# Patient Record
Sex: MALE | Race: WHITE | HISPANIC OR LATINO | Employment: UNEMPLOYED | URBAN - METROPOLITAN AREA
[De-identification: names, ages, dates, MRNs, and addresses within clinical notes are randomized per-mention and may not be internally consistent; named-entity substitution may affect disease eponyms.]

---

## 2022-04-13 ENCOUNTER — HOSPITAL ENCOUNTER (EMERGENCY)
Facility: HOSPITAL | Age: 27
Discharge: HOME | End: 2022-04-13
Attending: EMERGENCY MEDICINE
Payer: COMMERCIAL

## 2022-04-13 ENCOUNTER — APPOINTMENT (EMERGENCY)
Dept: RADIOLOGY | Facility: HOSPITAL | Age: 27
End: 2022-04-13
Payer: COMMERCIAL

## 2022-04-13 VITALS
OXYGEN SATURATION: 100 % | RESPIRATION RATE: 16 BRPM | SYSTOLIC BLOOD PRESSURE: 113 MMHG | DIASTOLIC BLOOD PRESSURE: 65 MMHG | TEMPERATURE: 97.3 F | HEART RATE: 74 BPM

## 2022-04-13 DIAGNOSIS — S82.142A TIBIAL PLATEAU FRACTURE, LEFT, CLOSED, INITIAL ENCOUNTER: Primary | ICD-10-CM

## 2022-04-13 PROCEDURE — 96372 THER/PROPH/DIAG INJ SC/IM: CPT

## 2022-04-13 PROCEDURE — 73590 X-RAY EXAM OF LOWER LEG: CPT

## 2022-04-13 PROCEDURE — 99285 EMERGENCY DEPT VISIT HI MDM: CPT | Performed by: PHYSICIAN ASSISTANT

## 2022-04-13 PROCEDURE — 73564 X-RAY EXAM KNEE 4 OR MORE: CPT

## 2022-04-13 PROCEDURE — 99283 EMERGENCY DEPT VISIT LOW MDM: CPT

## 2022-04-13 RX ORDER — MORPHINE SULFATE 4 MG/ML
4 INJECTION, SOLUTION INTRAMUSCULAR; INTRAVENOUS ONCE
Status: COMPLETED | OUTPATIENT
Start: 2022-04-13 | End: 2022-04-13

## 2022-04-13 RX ORDER — OXYCODONE HYDROCHLORIDE AND ACETAMINOPHEN 5; 325 MG/1; MG/1
1 TABLET ORAL EVERY 6 HOURS PRN
Qty: 12 TABLET | Refills: 0 | Status: SHIPPED | OUTPATIENT
Start: 2022-04-13 | End: 2022-04-14 | Stop reason: SDUPTHER

## 2022-04-13 RX ORDER — ACETAMINOPHEN 325 MG/1
650 TABLET ORAL ONCE
Status: COMPLETED | OUTPATIENT
Start: 2022-04-13 | End: 2022-04-13

## 2022-04-13 RX ADMIN — ACETAMINOPHEN 325MG 650 MG: 325 TABLET ORAL at 20:51

## 2022-04-13 RX ADMIN — MORPHINE SULFATE 4 MG: 4 INJECTION INTRAVENOUS at 21:25

## 2022-04-13 NOTE — Clinical Note
Marquesclyde Vu was seen and treated in our emergency department on 4/13/2022  No restrictions            Diagnosis:     [facility] DELMY  may return to work on return date  He may return on this date: 04/18/2022         If you have any questions or concerns, please don't hesitate to call        Amy Fontaine RN    ______________________________           _______________          _______________  Hospital Representative                              Date                                Time

## 2022-04-14 ENCOUNTER — TELEPHONE (OUTPATIENT)
Dept: EMERGENCY DEPT | Facility: HOSPITAL | Age: 27
End: 2022-04-14

## 2022-04-14 RX ORDER — OXYCODONE HYDROCHLORIDE AND ACETAMINOPHEN 5; 325 MG/1; MG/1
1 TABLET ORAL EVERY 6 HOURS PRN
Qty: 12 TABLET | Refills: 0 | Status: SHIPPED | OUTPATIENT
Start: 2022-04-14 | End: 2022-04-18 | Stop reason: HOSPADM

## 2022-04-14 NOTE — DISCHARGE INSTRUCTIONS
Take medications as directed  Do not drink or drive after taking these medicines  Call the orthopedic specialist tomorrow morning to schedule an appointment  Return for worsening complaints

## 2022-04-14 NOTE — TELEPHONE ENCOUNTER
Patient called stating he is unable to fill his prescription for oxycodone-acetaminophen due to address discrepancies  I spoke with a pharmacist at the [facility] where it was sent, they stated that the address and telephone number they were giving were different than the ones sent over  The address and phone number in our system were not the ones the pharmicist had  They recommend we try to send again with the address and phone number we have for the patient  Dr Letty Tavarez, the supervising Physician for the patient yesterday, resent the script
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
## 2022-04-15 ENCOUNTER — HOSPITAL ENCOUNTER (OUTPATIENT)
Dept: RADIOLOGY | Facility: HOSPITAL | Age: 27
Discharge: HOME | End: 2022-04-15
Attending: ORTHOPAEDIC SURGERY
Payer: COMMERCIAL

## 2022-04-15 ENCOUNTER — APPOINTMENT (OUTPATIENT)
Dept: LAB | Facility: HOSPITAL | Age: 27
End: 2022-04-15
Attending: ORTHOPAEDIC SURGERY
Payer: COMMERCIAL

## 2022-04-15 VITALS
BODY MASS INDEX: 20.32 KG/M2 | WEIGHT: 150 LBS | DIASTOLIC BLOOD PRESSURE: 74 MMHG | SYSTOLIC BLOOD PRESSURE: 105 MMHG | HEIGHT: 72 IN | HEART RATE: 96 BPM

## 2022-04-15 DIAGNOSIS — S82.142A TIBIAL PLATEAU FRACTURE, LEFT, CLOSED, INITIAL ENCOUNTER: ICD-10-CM

## 2022-04-15 LAB
ANION GAP SERPL CALCULATED.3IONS-SCNC: -2 MMOL/L (ref 4–13)
ATRIAL RATE: 69 BPM
BASOPHILS # BLD AUTO: 0.02 THOUSANDS/ΜL (ref 0–0.1)
BASOPHILS NFR BLD AUTO: 0 % (ref 0–1)
BUN SERPL-MCNC: 13 MG/DL (ref 5–25)
CALCIUM SERPL-MCNC: 9.1 MG/DL (ref 8.3–10.1)
CHLORIDE SERPL-SCNC: 106 MMOL/L (ref 100–108)
CO2 SERPL-SCNC: 32 MMOL/L (ref 21–32)
CREAT SERPL-MCNC: 1.01 MG/DL (ref 0.6–1.3)
EOSINOPHIL # BLD AUTO: 0.19 THOUSAND/ΜL (ref 0–0.61)
EOSINOPHIL NFR BLD AUTO: 2 % (ref 0–6)
ERYTHROCYTE [DISTWIDTH] IN BLOOD BY AUTOMATED COUNT: 13.2 % (ref 11.6–15.1)
GFR SERPL CREATININE-BSD FRML MDRD: 101 ML/MIN/1.73SQ M
GLUCOSE P FAST SERPL-MCNC: 87 MG/DL (ref 65–99)
HCT VFR BLD AUTO: 41.2 % (ref 36.5–49.3)
HGB BLD-MCNC: 13.6 G/DL (ref 12–17)
IMM GRANULOCYTES # BLD AUTO: 0.03 THOUSAND/UL (ref 0–0.2)
IMM GRANULOCYTES NFR BLD AUTO: 0 % (ref 0–2)
LYMPHOCYTES # BLD AUTO: 2.08 THOUSANDS/ΜL (ref 0.6–4.47)
LYMPHOCYTES NFR BLD AUTO: 23 % (ref 14–44)
MCH RBC QN AUTO: 30.5 PG (ref 26.8–34.3)
MCHC RBC AUTO-ENTMCNC: 33 G/DL (ref 31.4–37.4)
MCV RBC AUTO: 92 FL (ref 82–98)
MONOCYTES # BLD AUTO: 1.04 THOUSAND/ΜL (ref 0.17–1.22)
MONOCYTES NFR BLD AUTO: 11 % (ref 4–12)
NEUTROPHILS # BLD AUTO: 5.85 THOUSANDS/ΜL (ref 1.85–7.62)
NEUTS SEG NFR BLD AUTO: 64 % (ref 43–75)
NRBC BLD AUTO-RTO: 0 /100 WBCS
P AXIS: 76 DEGREES
PLATELET # BLD AUTO: 164 THOUSANDS/UL (ref 149–390)
PMV BLD AUTO: 10.8 FL (ref 8.9–12.7)
POTASSIUM SERPL-SCNC: 3.8 MMOL/L (ref 3.5–5.3)
PR INTERVAL: 168 MS
QRS AXIS: 83 DEGREES
QRSD INTERVAL: 100 MS
QT INTERVAL: 366 MS
QTC INTERVAL: 392 MS
RBC # BLD AUTO: 4.46 MILLION/UL (ref 3.88–5.62)
SODIUM SERPL-SCNC: 136 MMOL/L (ref 136–145)
T WAVE AXIS: 62 DEGREES
VENTRICULAR RATE: 69 BPM
WBC # BLD AUTO: 9.21 THOUSAND/UL (ref 4.31–10.16)

## 2022-04-15 PROCEDURE — 93010 ELECTROCARDIOGRAM REPORT: CPT | Performed by: INTERNAL MEDICINE

## 2022-04-15 PROCEDURE — 93005 ELECTROCARDIOGRAM TRACING: CPT

## 2022-04-15 PROCEDURE — 99204 OFFICE O/P NEW MOD 45 MIN: CPT | Performed by: ORTHOPAEDIC SURGERY

## 2022-04-15 PROCEDURE — 73700 CT LOWER EXTREMITY W/O DYE: CPT

## 2022-04-15 PROCEDURE — 80048 BASIC METABOLIC PNL TOTAL CA: CPT

## 2022-04-15 PROCEDURE — 36415 COLL VENOUS BLD VENIPUNCTURE: CPT

## 2022-04-15 PROCEDURE — 85025 COMPLETE CBC W/AUTO DIFF WBC: CPT

## 2022-04-15 PROCEDURE — G1004 CDSM NDSC: HCPCS

## 2022-04-15 RX ORDER — CEFAZOLIN SODIUM 2 G/50ML
2000 SOLUTION INTRAVENOUS ONCE
Status: CANCELLED | OUTPATIENT
Start: 2022-04-15 | End: 2022-04-15

## 2022-04-15 RX ORDER — CHLORHEXIDINE GLUCONATE 0.12 MG/ML
15 RINSE ORAL ONCE
Status: CANCELLED | OUTPATIENT
Start: 2022-04-15 | End: 2022-04-15

## 2022-04-15 RX ORDER — OXYCODONE HYDROCHLORIDE 5 MG/1
5 TABLET ORAL EVERY 4 HOURS PRN
Qty: 28 TABLET | Refills: 0 | Status: SHIPPED | OUTPATIENT
Start: 2022-04-17

## 2022-04-15 RX ORDER — NALOXONE HYDROCHLORIDE 4 MG/.1ML
SPRAY NASAL
Qty: 1 EACH | Refills: 1 | Status: SHIPPED | OUTPATIENT
Start: 2022-04-15
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
## 2022-04-15 NOTE — LETTER
April 15, 2022     Patient: Gareth Avendano  YOB: 1995  Date of Visit: 4/15/2022      To Whom it May Concern:    Miranda Zavaleta is under my professional care  Sarina Solorzano was seen in my office on 4/15/2022  Patient may not return to work until otherwise stated  If you have any questions or concerns, please don't hesitate to call  Sincerely,          Keven Stapleton MD        CC: Cinda Thomas

## 2022-04-15 NOTE — PATIENT INSTRUCTIONS
- Discussed conservative and surgical intervention with patient at length  Patient opted for surgical intervention at this time   - Risks and benefits were discussed with patient at length  Procedure being performed: ORIF left tibial plateau fracture  informed consent was obtained at this time  - STAT CT of the left knee ordered to evaluate extent of fracture   - Non-weight bearing left lower extremity in knee immobilizer   - Out of work until otherwise stated   - Over the counter analgesics as needed / directed   - Ice / heat as directed   - Please d/c tobacco use until otherwise stated  Failure to do so may result in wound healing complications, suboptimal surgical results, possible limb impairment, loss of limb     - Follow up post op for sutures out, XR

## 2022-04-17 ENCOUNTER — ANESTHESIA EVENT (OUTPATIENT)
Dept: PERIOP | Facility: HOSPITAL | Age: 27
End: 2022-04-17
Payer: COMMERCIAL

## 2022-04-18 ENCOUNTER — HOSPITAL ENCOUNTER (OUTPATIENT)
Facility: HOSPITAL | Age: 27
Setting detail: OUTPATIENT SURGERY
Discharge: HOME | End: 2022-04-18
Attending: ORTHOPAEDIC SURGERY | Admitting: ORTHOPAEDIC SURGERY
Payer: COMMERCIAL

## 2022-04-18 ENCOUNTER — APPOINTMENT (OUTPATIENT)
Dept: RADIOLOGY | Facility: HOSPITAL | Age: 27
End: 2022-04-18
Payer: COMMERCIAL

## 2022-04-18 ENCOUNTER — ANESTHESIA (OUTPATIENT)
Dept: PERIOP | Facility: HOSPITAL | Age: 27
End: 2022-04-18
Payer: COMMERCIAL

## 2022-04-18 VITALS
HEART RATE: 73 BPM | WEIGHT: 150 LBS | SYSTOLIC BLOOD PRESSURE: 137 MMHG | BODY MASS INDEX: 19.25 KG/M2 | OXYGEN SATURATION: 97 % | RESPIRATION RATE: 16 BRPM | HEIGHT: 74 IN | DIASTOLIC BLOOD PRESSURE: 82 MMHG | TEMPERATURE: 98.1 F

## 2022-04-18 DIAGNOSIS — S82.142A TIBIAL PLATEAU FRACTURE, LEFT, CLOSED, INITIAL ENCOUNTER: Primary | ICD-10-CM

## 2022-04-18 PROBLEM — F17.200 SMOKING: Status: ACTIVE | Noted: 2022-04-18

## 2022-04-18 PROCEDURE — C1713 ANCHOR/SCREW BN/BN,TIS/BN: HCPCS | Performed by: ORTHOPAEDIC SURGERY

## 2022-04-18 PROCEDURE — 27536 TREAT KNEE FRACTURE: CPT | Performed by: PHYSICIAN ASSISTANT

## 2022-04-18 PROCEDURE — 73590 X-RAY EXAM OF LOWER LEG: CPT

## 2022-04-18 PROCEDURE — C1769 GUIDE WIRE: HCPCS | Performed by: ORTHOPAEDIC SURGERY

## 2022-04-18 PROCEDURE — 27536 TREAT KNEE FRACTURE: CPT | Performed by: ORTHOPAEDIC SURGERY

## 2022-04-18 DEVICE — 3.5MM VARIABLE ANGLE LOCKING SCREW/SLF-TPNG/STRDRV/75MM: Type: IMPLANTABLE DEVICE | Site: LEG | Status: FUNCTIONAL

## 2022-04-18 DEVICE — 3.5MM CORTEX SCREW SELF-TAPPING 44MM: Type: IMPLANTABLE DEVICE | Site: LEG | Status: FUNCTIONAL

## 2022-04-18 DEVICE — 3.5MM LCP T-PLATE 3H HEAD/ 5H SHAFT/67MM-RIGHT ANGLE
Type: IMPLANTABLE DEVICE | Site: LEG | Status: FUNCTIONAL
Brand: LCP

## 2022-04-18 DEVICE — 3.5MM CORTEX SCREW SELF-TAPPING 36MM: Type: IMPLANTABLE DEVICE | Site: LEG | Status: FUNCTIONAL

## 2022-04-18 DEVICE — 3.5MM CORTEX SCREW SELF-TAPPING 55MM: Type: IMPLANTABLE DEVICE | Site: LEG | Status: FUNCTIONAL

## 2022-04-18 DEVICE — 3.5MM LCP RECONSTRUCTION PLATE 6 HOLES/84MM
Type: IMPLANTABLE DEVICE | Site: LEG | Status: FUNCTIONAL
Brand: LCP

## 2022-04-18 DEVICE — 4.0MM CANNULATED SCREW LONG THREAD/48MM: Type: IMPLANTABLE DEVICE | Site: LEG | Status: FUNCTIONAL

## 2022-04-18 DEVICE — 3.5MM PELVIC CORTEX SCREW SELF-TAPPING 80MM: Type: IMPLANTABLE DEVICE | Site: LEG | Status: FUNCTIONAL

## 2022-04-18 DEVICE — 3.5MM VARIABLE ANGLE LOCKING SCREW/SLF-TPNG/STRDRV/65MM: Type: IMPLANTABLE DEVICE | Site: LEG | Status: FUNCTIONAL

## 2022-04-18 DEVICE — 3.5MM LOCKING SCREW SLF-TPNG W/STARDRIVE(TM) RECESS 34MM: Type: IMPLANTABLE DEVICE | Site: LEG | Status: FUNCTIONAL

## 2022-04-18 DEVICE — 3.5MM CORTEX SCREW SELF-TAPPING 38MM: Type: IMPLANTABLE DEVICE | Site: LEG | Status: FUNCTIONAL

## 2022-04-18 DEVICE — 3.5MM LOCKING SCREW SLF-TPNG W/STARDRIVE(TM) RECESS 22MM: Type: IMPLANTABLE DEVICE | Site: LEG | Status: FUNCTIONAL

## 2022-04-18 DEVICE — 3.5MM VA-LCP PROX TIBIA PLATE SMALL BEND/6H/117MM/LEFT
Type: IMPLANTABLE DEVICE | Site: LEG | Status: FUNCTIONAL
Brand: VA-LCP

## 2022-04-18 DEVICE — 3.5MM VARIABLE ANGLE LOCKING SCREW/SLF-TPNG/STRDRV/70MM: Type: IMPLANTABLE DEVICE | Site: LEG | Status: FUNCTIONAL

## 2022-04-18 DEVICE — 3.5MM PELVIC CORTEX SCREW SELF-TAPPING 75MM: Type: IMPLANTABLE DEVICE | Site: LEG | Status: FUNCTIONAL

## 2022-04-18 RX ORDER — CEFAZOLIN SODIUM 2 G/50ML
2000 SOLUTION INTRAVENOUS ONCE
Status: COMPLETED | OUTPATIENT
Start: 2022-04-18 | End: 2022-04-18

## 2022-04-18 RX ORDER — GLYCOPYRROLATE 0.2 MG/ML
INJECTION INTRAMUSCULAR; INTRAVENOUS AS NEEDED
Status: DISCONTINUED | OUTPATIENT
Start: 2022-04-18 | End: 2022-04-18

## 2022-04-18 RX ORDER — PROPOFOL 10 MG/ML
INJECTION, EMULSION INTRAVENOUS AS NEEDED
Status: DISCONTINUED | OUTPATIENT
Start: 2022-04-18 | End: 2022-04-18

## 2022-04-18 RX ORDER — ROCURONIUM BROMIDE 10 MG/ML
INJECTION, SOLUTION INTRAVENOUS AS NEEDED
Status: DISCONTINUED | OUTPATIENT
Start: 2022-04-18 | End: 2022-04-18

## 2022-04-18 RX ORDER — SODIUM CHLORIDE 9 MG/ML
INJECTION, SOLUTION INTRAVENOUS CONTINUOUS PRN
Status: DISCONTINUED | OUTPATIENT
Start: 2022-04-18 | End: 2022-04-18

## 2022-04-18 RX ORDER — CHLORHEXIDINE GLUCONATE 0.12 MG/ML
15 RINSE ORAL ONCE
Status: COMPLETED | OUTPATIENT
Start: 2022-04-18 | End: 2022-04-18

## 2022-04-18 RX ORDER — BUPIVACAINE HYDROCHLORIDE 2.5 MG/ML
INJECTION, SOLUTION EPIDURAL; INFILTRATION; INTRACAUDAL AS NEEDED
Status: DISCONTINUED | OUTPATIENT
Start: 2022-04-18 | End: 2022-04-18 | Stop reason: HOSPADM

## 2022-04-18 RX ORDER — VANCOMYCIN HYDROCHLORIDE 1 G/20ML
INJECTION, POWDER, LYOPHILIZED, FOR SOLUTION INTRAVENOUS AS NEEDED
Status: DISCONTINUED | OUTPATIENT
Start: 2022-04-18 | End: 2022-04-18 | Stop reason: HOSPADM

## 2022-04-18 RX ORDER — HYDROMORPHONE HCL/PF 1 MG/ML
0.5 SYRINGE (ML) INJECTION
Status: COMPLETED | OUTPATIENT
Start: 2022-04-18 | End: 2022-04-18

## 2022-04-18 RX ORDER — DEXAMETHASONE SODIUM PHOSPHATE 10 MG/ML
INJECTION, SOLUTION INTRAMUSCULAR; INTRAVENOUS AS NEEDED
Status: DISCONTINUED | OUTPATIENT
Start: 2022-04-18 | End: 2022-04-18

## 2022-04-18 RX ORDER — KETAMINE HCL IN NACL, ISO-OSM 100MG/10ML
SYRINGE (ML) INJECTION AS NEEDED
Status: DISCONTINUED | OUTPATIENT
Start: 2022-04-18 | End: 2022-04-18

## 2022-04-18 RX ORDER — MIDAZOLAM HYDROCHLORIDE 2 MG/2ML
INJECTION, SOLUTION INTRAMUSCULAR; INTRAVENOUS AS NEEDED
Status: DISCONTINUED | OUTPATIENT
Start: 2022-04-18 | End: 2022-04-18

## 2022-04-18 RX ORDER — ONDANSETRON 2 MG/ML
INJECTION INTRAMUSCULAR; INTRAVENOUS AS NEEDED
Status: DISCONTINUED | OUTPATIENT
Start: 2022-04-18 | End: 2022-04-18

## 2022-04-18 RX ORDER — ONDANSETRON 2 MG/ML
4 INJECTION INTRAMUSCULAR; INTRAVENOUS EVERY 6 HOURS PRN
Status: CANCELLED | OUTPATIENT
Start: 2022-04-18

## 2022-04-18 RX ORDER — ACETAMINOPHEN 325 MG/1
650 TABLET ORAL EVERY 6 HOURS PRN
Status: CANCELLED | OUTPATIENT
Start: 2022-04-18

## 2022-04-18 RX ORDER — ASPIRIN 81 MG/1
81 TABLET ORAL EVERY 12 HOURS
Qty: 84 TABLET | Refills: 0 | Status: SHIPPED | OUTPATIENT
Start: 2022-04-18

## 2022-04-18 RX ORDER — HYDROMORPHONE HCL 110MG/55ML
PATIENT CONTROLLED ANALGESIA SYRINGE INTRAVENOUS AS NEEDED
Status: DISCONTINUED | OUTPATIENT
Start: 2022-04-18 | End: 2022-04-18

## 2022-04-18 RX ORDER — KETOROLAC TROMETHAMINE 30 MG/ML
INJECTION, SOLUTION INTRAMUSCULAR; INTRAVENOUS AS NEEDED
Status: DISCONTINUED | OUTPATIENT
Start: 2022-04-18 | End: 2022-04-18

## 2022-04-18 RX ORDER — MAGNESIUM HYDROXIDE 1200 MG/15ML
LIQUID ORAL AS NEEDED
Status: DISCONTINUED | OUTPATIENT
Start: 2022-04-18 | End: 2022-04-18 | Stop reason: HOSPADM

## 2022-04-18 RX ORDER — LIDOCAINE HYDROCHLORIDE 10 MG/ML
INJECTION, SOLUTION EPIDURAL; INFILTRATION; INTRACAUDAL; PERINEURAL AS NEEDED
Status: DISCONTINUED | OUTPATIENT
Start: 2022-04-18 | End: 2022-04-18

## 2022-04-18 RX ORDER — NEOSTIGMINE METHYLSULFATE 1 MG/ML
INJECTION INTRAVENOUS AS NEEDED
Status: DISCONTINUED | OUTPATIENT
Start: 2022-04-18 | End: 2022-04-18

## 2022-04-18 RX ORDER — FENTANYL CITRATE/PF 50 MCG/ML
50 SYRINGE (ML) INJECTION
Status: DISCONTINUED | OUTPATIENT
Start: 2022-04-18 | End: 2022-04-18 | Stop reason: HOSPADM

## 2022-04-18 RX ORDER — LIDOCAINE HYDROCHLORIDE 20 MG/ML
INJECTION, SOLUTION EPIDURAL; INFILTRATION; INTRACAUDAL; PERINEURAL AS NEEDED
Status: DISCONTINUED | OUTPATIENT
Start: 2022-04-18 | End: 2022-04-18

## 2022-04-18 RX ORDER — FENTANYL CITRATE 50 UG/ML
INJECTION, SOLUTION INTRAMUSCULAR; INTRAVENOUS AS NEEDED
Status: DISCONTINUED | OUTPATIENT
Start: 2022-04-18 | End: 2022-04-18

## 2022-04-18 RX ORDER — TRAMADOL HYDROCHLORIDE 50 MG/1
50 TABLET ORAL EVERY 6 HOURS SCHEDULED
Status: CANCELLED | OUTPATIENT
Start: 2022-04-18

## 2022-04-18 RX ORDER — SODIUM CHLORIDE, SODIUM LACTATE, POTASSIUM CHLORIDE, CALCIUM CHLORIDE 600; 310; 30; 20 MG/100ML; MG/100ML; MG/100ML; MG/100ML
125 INJECTION, SOLUTION INTRAVENOUS CONTINUOUS
Status: DISCONTINUED | OUTPATIENT
Start: 2022-04-18 | End: 2022-04-18

## 2022-04-18 RX ORDER — HYDROMORPHONE HCL/PF 1 MG/ML
0.5 SYRINGE (ML) INJECTION
Status: DISCONTINUED | OUTPATIENT
Start: 2022-04-18 | End: 2022-04-18 | Stop reason: HOSPADM

## 2022-04-18 RX ORDER — ONDANSETRON 2 MG/ML
4 INJECTION INTRAMUSCULAR; INTRAVENOUS ONCE AS NEEDED
Status: DISCONTINUED | OUTPATIENT
Start: 2022-04-18 | End: 2022-04-18 | Stop reason: HOSPADM

## 2022-04-18 RX ADMIN — SODIUM CHLORIDE, SODIUM LACTATE, POTASSIUM CHLORIDE, AND CALCIUM CHLORIDE: .6; .31; .03; .02 INJECTION, SOLUTION INTRAVENOUS at 10:20

## 2022-04-18 RX ADMIN — ROCURONIUM BROMIDE 10 MG: 10 INJECTION, SOLUTION INTRAVENOUS at 09:07

## 2022-04-18 RX ADMIN — HYDROMORPHONE HYDROCHLORIDE 0.5 MG: 1 INJECTION, SOLUTION INTRAMUSCULAR; INTRAVENOUS; SUBCUTANEOUS at 12:55

## 2022-04-18 RX ADMIN — FENTANYL CITRATE 100 MCG: 50 INJECTION, SOLUTION INTRAMUSCULAR; INTRAVENOUS at 08:07

## 2022-04-18 RX ADMIN — SODIUM CHLORIDE: 0.9 INJECTION, SOLUTION INTRAVENOUS at 08:01

## 2022-04-18 RX ADMIN — HYDROMORPHONE HYDROCHLORIDE 0.5 MG: 2 INJECTION, SOLUTION INTRAMUSCULAR; INTRAVENOUS; SUBCUTANEOUS at 09:27

## 2022-04-18 RX ADMIN — MIDAZOLAM HYDROCHLORIDE 2 MG: 1 INJECTION, SOLUTION INTRAMUSCULAR; INTRAVENOUS at 07:51

## 2022-04-18 RX ADMIN — HYDROMORPHONE HYDROCHLORIDE 0.5 MG: 2 INJECTION, SOLUTION INTRAMUSCULAR; INTRAVENOUS; SUBCUTANEOUS at 08:40

## 2022-04-18 RX ADMIN — Medication 20 MG: at 09:27

## 2022-04-18 RX ADMIN — HYDROMORPHONE HYDROCHLORIDE 0.5 MG: 2 INJECTION, SOLUTION INTRAMUSCULAR; INTRAVENOUS; SUBCUTANEOUS at 10:09

## 2022-04-18 RX ADMIN — ROCURONIUM BROMIDE 10 MG: 10 INJECTION, SOLUTION INTRAVENOUS at 10:05

## 2022-04-18 RX ADMIN — SODIUM CHLORIDE, SODIUM LACTATE, POTASSIUM CHLORIDE, AND CALCIUM CHLORIDE: .6; .31; .03; .02 INJECTION, SOLUTION INTRAVENOUS at 07:28

## 2022-04-18 RX ADMIN — Medication 10 MG: at 08:50

## 2022-04-18 RX ADMIN — ONDANSETRON 4 MG: 2 INJECTION INTRAMUSCULAR; INTRAVENOUS at 11:25

## 2022-04-18 RX ADMIN — CHLORHEXIDINE GLUCONATE 0.12% ORAL RINSE 15 ML: 1.2 LIQUID ORAL at 07:38

## 2022-04-18 RX ADMIN — Medication 20 MG: at 10:11

## 2022-04-18 RX ADMIN — ROCURONIUM BROMIDE 20 MG: 10 INJECTION, SOLUTION INTRAVENOUS at 09:27

## 2022-04-18 RX ADMIN — FENTANYL CITRATE 50 MCG: 50 INJECTION, SOLUTION INTRAMUSCULAR; INTRAVENOUS at 07:51

## 2022-04-18 RX ADMIN — SODIUM CHLORIDE, SODIUM LACTATE, POTASSIUM CHLORIDE, AND CALCIUM CHLORIDE 125 ML/HR: .6; .31; .03; .02 INJECTION, SOLUTION INTRAVENOUS at 07:38

## 2022-04-18 RX ADMIN — LIDOCAINE HYDROCHLORIDE 100 MG: 20 INJECTION, SOLUTION EPIDURAL; INFILTRATION; INTRACAUDAL; PERINEURAL at 07:58

## 2022-04-18 RX ADMIN — DEXAMETHASONE SODIUM PHOSPHATE 10 MG: 10 INJECTION, SOLUTION INTRAMUSCULAR; INTRAVENOUS at 08:11

## 2022-04-18 RX ADMIN — FENTANYL CITRATE 50 MCG: 50 INJECTION, SOLUTION INTRAMUSCULAR; INTRAVENOUS at 12:26

## 2022-04-18 RX ADMIN — PROPOFOL 300 MG: 10 INJECTION, EMULSION INTRAVENOUS at 07:58

## 2022-04-18 RX ADMIN — FENTANYL CITRATE 50 MCG: 50 INJECTION, SOLUTION INTRAMUSCULAR; INTRAVENOUS at 12:33

## 2022-04-18 RX ADMIN — KETOROLAC TROMETHAMINE 30 MG: 30 INJECTION, SOLUTION INTRAMUSCULAR at 11:25

## 2022-04-18 RX ADMIN — NEOSTIGMINE METHYLSULFATE 3 MG: 1 INJECTION INTRAVENOUS at 12:01

## 2022-04-18 RX ADMIN — CEFAZOLIN SODIUM 2000 MG: 2 SOLUTION INTRAVENOUS at 07:55

## 2022-04-18 RX ADMIN — ROCURONIUM BROMIDE 10 MG: 10 INJECTION, SOLUTION INTRAVENOUS at 08:55

## 2022-04-18 RX ADMIN — HYDROMORPHONE HYDROCHLORIDE 0.5 MG: 1 INJECTION, SOLUTION INTRAMUSCULAR; INTRAVENOUS; SUBCUTANEOUS at 12:48

## 2022-04-18 RX ADMIN — ROCURONIUM BROMIDE 50 MG: 10 INJECTION, SOLUTION INTRAVENOUS at 07:58

## 2022-04-18 RX ADMIN — CEFAZOLIN SODIUM 2000 MG: 2 SOLUTION INTRAVENOUS at 11:46

## 2022-04-18 RX ADMIN — HYDROMORPHONE HYDROCHLORIDE 0.5 MG: 2 INJECTION, SOLUTION INTRAMUSCULAR; INTRAVENOUS; SUBCUTANEOUS at 10:39

## 2022-04-18 RX ADMIN — GLYCOPYRROLATE 0.4 MG: 0.2 INJECTION, SOLUTION INTRAMUSCULAR; INTRAVENOUS at 12:01

## 2022-04-18 RX ADMIN — FENTANYL CITRATE 50 MCG: 50 INJECTION, SOLUTION INTRAMUSCULAR; INTRAVENOUS at 07:58

## 2022-04-18 NOTE — DISCHARGE INSTRUCTIONS
Discharge Instructions - Orthopedics  Arlene Manners 31 y o  male MRN: [id number]  Unit/Bed#: PACU 2    Weight Bearing Status:                                           Nonweightbearing on left lower extremity in knee immobilizer  May remove knee immobilizer in 1 week for gentle movement of the knee     DVT prophylaxis  Aspirin 81 mg twice daily x6 weeks    Pain:  Continue analgesics as directed    Dressing Instructions: May remove dressings in 1 week  Okay to begin showering at that time  Allow water to flow over the incision sites  Do not vigorously scrubbed or soak wounds until otherwise stated  Otherwise keep dressings clean and dry  Appt Instructions: If you do not have your appointment, please call the clinic at [phone] t  Otherwise followup as scheduled     Contact the office sooner if you experience any increased numbness/tingling in the extremities  Miscellaneous:  Please d/c tobacco use until otherwise stated  Failure to do so may result in wound healing complications, suboptimal surgical results, possible limb impairment, loss of limb

## 2022-04-18 NOTE — ANESTHESIA POSTPROCEDURE EVALUATION
Post-Op Assessment Note    CV Status:  Stable  Pain Score: 0    Pain management: adequate     Mental Status:  Alert and awake   Hydration Status:  Euvolemic   PONV Controlled:  Controlled   Airway Patency:  Patent      Post Op Vitals Reviewed: Yes      Staff: Anesthesiologist, CRNA         No complications documented      /88 (04/18/22 1219)    Temp 99 1 °F (37 3 °C) (04/18/22 1219)    Pulse 92 (04/18/22 1219)   Resp 14 (04/18/22 1219)    SpO2 96 % (04/18/22 1219)

## 2022-04-18 NOTE — ANESTHESIA PREPROCEDURE EVALUATION
Procedure:  OPEN REDUCTION W/ INTERNAL FIXATION (ORIF) LEFT TIBIAL PLATEAU FRACTURE (Left Knee)    Relevant Problems   ANESTHESIA (within normal limits)      CARDIO (within normal limits)      ENDO (within normal limits)      PULMONARY   (+) Smoking        Physical Exam    Airway    Mallampati score: II  TM Distance: >3 FB  Neck ROM: full     Dental   No notable dental hx     Cardiovascular  Rhythm: regular, Rate: normal,     Pulmonary  Breath sounds clear to auscultation,     Other Findings        Anesthesia Plan  ASA Score- 2     Anesthesia Type- general with ASA Monitors  Additional Monitors:   Airway Plan:           Plan Factors-Exercise tolerance (METS): >4 METS  Chart reviewed  Existing labs reviewed  Patient summary reviewed  Patient is a current smoker  Patient smoked on day of surgery  Obstructive sleep apnea risk education given perioperatively  Induction- intravenous  Postoperative Plan- Plan for postoperative opioid use  Planned trial extubation    Informed Consent- Anesthetic plan and risks discussed with patient  I personally reviewed this patient with the CRNA  Discussed and agreed on the Anesthesia Plan with the CRNA  Darletta Pac

## 2022-04-18 NOTE — OP NOTE
OPERATIVE REPORT  PATIENT NAME: Veronica De Leon    :  1995  MRN: [id number]  Pt Location: [facility] ROOM 04    SURGERY DATE: 2022    Surgeon(s) and Role:     * Jules Reed MD - Primary     * Delbert Oneil PA-C - Assisting    Preop Diagnosis:  Left bicondylar tibial plateau fracture    Post-Op Diagnosis Codes:  Same    Procedure(s) (LRB):  OPEN REDUCTION W/ INTERNAL FIXATION (ORIF) LEFT TIBIAL PLATEAU FRACTURE (Left)    Procedure:  ORIF L bicondylar tibial plateau fracture (CPT 05852)    Specimen(s):  * No specimens in log *    Estimated Blood Loss:   200 mL    Drains:  [REMOVED] Urethral Catheter Non-latex 16 Fr  (Removed)   Number of days: 0       Anesthesia Type:   General    Operative Indications:  L tibia bicondylar tibial plateau fracture    Operative Findings:  See below    Complications:   None    Implants: Synthes 4 0 mm cannulated screw x1, 6 hl 3 5 mm VA-LCP proximal lateral tibia plate, 8 0FQ LCP 6 hl recon plate, 3 5 mm LCP  7PA/0IX T plate    Procedure and Technique:  Patient is a 44-year-old male who sustained a left tibia bicondylar plateau fracture  The patient was found to be in greater than 10° of clinical valgus at rest   The patient was also noted to have a fragment of bone that had displaced into the joint, fracture lines were found to be of a bicondylar nature although with minimal lateral articular surface depression  The patient was therefore indicated for operative fixation of his injury  The patient's operative site, laterality, procedure, consent were verified in the preoperative area the patient transitioned to the operating room  General anesthesia was provided by the anesthesia team, a nonsterile tourniquet was applied to the upper extremity and used for total of 120 minutes and not reinflated  A Rehman catheter was placed which was removed at the end of the case  The operative extremity was prepped and draped in the usual sterile fashion    After time-out for safety, standard anterolateral approach took place to the proximal lateral tibial plateau  Fascia was incised and anterior compartment musculature was elevated off the bone  Arthrotomy was then made at the level of the joint and hematoma was evacuated  Suture utilized through the capsular tissue and meniscus to provide retraction of tissue to be able to visualize the joint  There was no lateral meniscus tear noted  With traction and varus moment, we were able to visualize across to the medial compartment through our arthrotomy  The loose fragment of bone was identified in the joint with biplanar fluoroscopic imaging and was removed with pituitary  The tibial spine component of the fracture was then maneuvered into place with dental pick as well as an anterior lateral fragment containing articular cartilage was also maneuvered into position and these were held in place with K-wires  Medial incision was then made posterior to the posterior aspect of the proximal tibia  Dissection took place utilizing Metzenbaum scissors and taking care to protect saphenous structures and hamstring tendons  Fascia was incised and muscle released off the posterior aspect of the bone to identify posterior medial fracture line which was well reduced  A pointed Dickerson clamp was utilized to position the proximal lateral plateau condylar fragment into proper position and this was held in place with K-wires driven from lateral to medial   Clamp was removed and reduction was found to be maintained  To provide fixation at the anterior lateral articular component, a 4 0 mm cannulated screw was driven from anterior to posterior and was found to provide satisfactory compression  The proximal lateral tibial plate was then applied to the bone and periarticular clamp was applied to obtain further compression across the lateral split  Distal cortical and proximal cortical screws were placed to reduce plate to bone    A true 3 5 mm cortical lag screw was then also placed through the plate to provide further compression across the lateral split  Remaining screws were placed distally and locking cluster and kickstand locker screws were placed proximally  Attention was then returned to the medial aspect of the tibia, a T-plate was applied to the posteromedial aspect of the tibia and cortical screws were placed to apply the plate in buttress fashion  Given the more anteromedial fracture line as well as involvement of the whole medial condyle, a 6 hole recon plate was then contoured and placed directly medial and fixation took place distally with cortical screws and proximally with locking screws  The wounds were copiously irrigated with sterile saline  Medial fascia was closed with 0 Vicryl suture  Arthrotomy was repaired with 0 Ethibond suture  Lateral fascia was repaired with 0 Ethibond suture  Subcutaneous layer closure took place at both incisions with 2-0 Vicryl suture  Skin layer closure took place with 3-0 nylon suture in interrupted fashion  Sterile dressings consisted of Adaptic, sterile gauze, sterile ABD pad, sterile Webril  The leg was wrapped from foot to thigh with Webril and Ace wraps and the patient was placed in a knee immobilizer  All final counts, including but not limited to instrument, sponge, needle counts were correct  I was present for the entire procedure  The patient was extubated and awakened and transitioned to the PACU in stable condition  There were no immediate complications  There was no qualified resident available for the procedure  Critical assistance from Miguel Salazar PA-C was required for all components of the procedure including but not limited to positioning, soft tissue retraction, assistance with reduction of fracture, assistance with visualization of articular surface  This was particularly important given the complex articular nature of the injury      The patient will be nonweightbearing on the operative extremity for an anticipated 10-12 weeks  The patient will require aspirin for deep vein thrombosis prophylaxis twice daily  We will allow the patient to shower in 7 days and begin knee range of motion in 7 days      Patient Disposition:  PACU       SIGNATURE: Mary Chao MD  DATE: April 18, 2022  TIME: 3:10 PM

## 2022-04-18 NOTE — INTERVAL H&P NOTE
H&P reviewed  After examining the patient I find no changes in the patients condition since the H&P had been written  Abdomen  :  soft, non-tender   Present       See chart for vitals

## 2022-04-19 ENCOUNTER — TELEPHONE (OUTPATIENT)
Dept: OBGYN CLINIC | Facility: OTHER | Age: 27
End: 2022-04-19

## 2022-04-19 DIAGNOSIS — S82.142A TIBIAL PLATEAU FRACTURE, LEFT, CLOSED, INITIAL ENCOUNTER: Primary | ICD-10-CM

## 2022-04-19 NOTE — TELEPHONE ENCOUNTER
Spoke to patient  He stated he is having a lot of pain post surgery 10/10  Stated he is not using ice or tylenol  Reports he is taking ASA and oxycodone at this time as well as aleve for pain  Advised ice 20 mins on 20 mins off  Elevate above heart level  tylenol 1000 mg Q8h, no more than 3000 mg in 24hr  Advised to avoid aleve unless otherwise recommended because with ASA it can lead to increased risk of bleeding  Please advise if it is okay for patient to take Aleve OTC in addition to the ASA that is recommended? Please advise other recommendations

## 2022-04-19 NOTE — TELEPHONE ENCOUNTER
TT from Lori Whitlock confirmed patient can take Aleve  Also recommended 2-5mg tablets of oxycodone x 4 doses then back to 1 tablet per dose  Reiterated the importance of ice and elevation as well as tylenol and aleve use as recommended on the bottle  Understanding was verbalized  He will report to the ER if pain remains uncontrolled with above measures  He stated he only has 12 tablets left at this time  Requesting a refill since he will run out tomorrow taking it as advised above  Please advise

## 2022-04-19 NOTE — TELEPHONE ENCOUNTER
Ok to begin oxycodone 10mg q 4 hours, add on aleve at this time  Ice and elevation    If pain is not controlled with these methods, would recommend ED visit for pain control

## 2022-04-19 NOTE — TELEPHONE ENCOUNTER
Patient called in , he said the OxyCodone is not working at all and he is in a lot of pain  He is taking it as directed  He also asked if the the Aspirin is OTC    Can we do anything else to help with patient?     C/b # [phone]

## 2022-04-20 ENCOUNTER — TELEPHONE (OUTPATIENT)
Dept: OBGYN CLINIC | Facility: OTHER | Age: 27
End: 2022-04-20

## 2022-04-20 RX ORDER — NALOXONE HYDROCHLORIDE 4 MG/.1ML
SPRAY NASAL
Qty: 1 EACH | Refills: 1 | Status: SHIPPED | OUTPATIENT
Start: 2022-04-20

## 2022-04-20 RX ORDER — OXYCODONE HYDROCHLORIDE 10 MG/1
10 TABLET ORAL EVERY 4 HOURS PRN
Qty: 28 TABLET | Refills: 0 | Status: SHIPPED | OUTPATIENT
Start: 2022-04-20

## 2022-04-20 NOTE — TELEPHONE ENCOUNTER
Patient called in , he said "I cannot do anything on my own and I was wondering if Dr Preston Carrizales could write a letter for my girl to be excused form work and stay home with me to help take care of me"    Once / if letter is completed please fax      He will call back with fax number     Fax #     C/b # [phone]

## 2022-04-20 NOTE — TELEPHONE ENCOUNTER
Please email the work note for his girl (Catarina Huang) to be home and care for him until he is mobile, at least a few days, to her boss at  [employer]  [email or web address]  [email or web address]

## 2022-04-20 NOTE — TELEPHONE ENCOUNTER
Spoke to patient  He stated the 10 mg oxycodone Q4h helped his pain but since he is back to 1 the pain is severe again  He stated he will need a refill if it is okay to continue 10 mg Q4h until he is seen in the office  Please advise

## 2022-04-20 NOTE — TELEPHONE ENCOUNTER
Spoke to patient  He stated his girlfriend has FMLA for 2 days  Stated he will call back with a fax number this evening for the note to be faxed to

## 2022-04-21 NOTE — TELEPHONE ENCOUNTER
Patient called and stated that the pharmacy requires verification for script for Oxycodone  Patient asked to speak to clinical team   He states insurance company sent a form over to the Dr Gisell Nicole office in [facility] for [address]

## 2022-04-26 ENCOUNTER — TELEPHONE (OUTPATIENT)
Dept: OBGYN CLINIC | Facility: OTHER | Age: 27
End: 2022-04-26

## 2022-04-26 NOTE — TELEPHONE ENCOUNTER
Spoke to patient about dressings  Did refer to the AVS  Advised that he can remove the dressings  He asked if he has to recover, advised he may if it Is more comfortable  Understanding verbalized

## 2022-04-26 NOTE — TELEPHONE ENCOUNTER
Patient called in for a refill (other telephone encounter) but now has a question in regards to what bandages he was to take off, he cannot remember      C/b # [phone]

## 2022-04-26 NOTE — TELEPHONE ENCOUNTER
Pt contacted Call Center requested refill of their medication        Medication Name:    OxyCodone    Dosage of Med:    10mg    Frequency of Med:    Every four hours    Remaining Medication:    2 pills    Pharmacy and Location:    [address]

## 2022-04-29 DIAGNOSIS — S82.142A TIBIAL PLATEAU FRACTURE, LEFT, CLOSED, INITIAL ENCOUNTER: Primary | ICD-10-CM

## 2022-05-01 DIAGNOSIS — S82.142A TIBIAL PLATEAU FRACTURE, LEFT, CLOSED, INITIAL ENCOUNTER: ICD-10-CM

## 2022-05-01 NOTE — TELEPHONE ENCOUNTER
Pt is requesting refill of oxyCODONE (ROXICODONE) 10 MG TABS be sent to: [facility], informed due to controlled substance medication would be sent to office and if there are any issues they will reach out

## 2022-05-02 ENCOUNTER — TELEPHONE (OUTPATIENT)
Dept: OBGYN CLINIC | Facility: HOSPITAL | Age: 27
End: 2022-05-02

## 2022-05-02 RX ORDER — OXYCODONE HYDROCHLORIDE 10 MG/1
10 TABLET ORAL EVERY 4 HOURS PRN
Qty: 28 TABLET | Refills: 0 | Status: SHIPPED | OUTPATIENT
Start: 2022-05-02

## 2022-05-02 NOTE — TELEPHONE ENCOUNTER
Pt sees Dr Hubert Schroeder Pt contacted Call Center requested refill of their medication  Medication Name:Oxycodone    Dosage of Med:10mg      Frequency of Med:every 4 hrs for mederate pain      Remaining Medication:1      Pharmacy and [address],   [address] [address]   Phone:  [phone]  Fax:  [phone]         Pt   Preferred Callback Phone Number:[phone]

## 2022-05-06 ENCOUNTER — HOSPITAL ENCOUNTER (OUTPATIENT)
Dept: RADIOLOGY | Facility: HOSPITAL | Age: 27
Discharge: HOME | End: 2022-05-06
Attending: ORTHOPAEDIC SURGERY
Payer: COMMERCIAL

## 2022-05-06 ENCOUNTER — HOME HEALTH ADMISSION (OUTPATIENT)
Dept: HOME HEALTH SERVICES | Facility: HOME HEALTHCARE | Age: 27
End: 2022-05-06
Payer: COMMERCIAL

## 2022-05-06 ENCOUNTER — OFFICE VISIT (OUTPATIENT)
Dept: OBGYN CLINIC | Facility: HOSPITAL | Age: 27
End: 2022-05-06

## 2022-05-06 VITALS
DIASTOLIC BLOOD PRESSURE: 71 MMHG | BODY MASS INDEX: 19.25 KG/M2 | SYSTOLIC BLOOD PRESSURE: 128 MMHG | HEART RATE: 71 BPM | HEIGHT: 74 IN | WEIGHT: 150 LBS

## 2022-05-06 DIAGNOSIS — M25.572 PAIN, JOINT, ANKLE AND FOOT, LEFT: ICD-10-CM

## 2022-05-06 DIAGNOSIS — S82.142A TIBIAL PLATEAU FRACTURE, LEFT, CLOSED, INITIAL ENCOUNTER: Primary | ICD-10-CM

## 2022-05-06 DIAGNOSIS — S82.142A TIBIAL PLATEAU FRACTURE, LEFT, CLOSED, INITIAL ENCOUNTER: ICD-10-CM

## 2022-05-06 PROCEDURE — 73560 X-RAY EXAM OF KNEE 1 OR 2: CPT

## 2022-05-06 PROCEDURE — 73610 X-RAY EXAM OF ANKLE: CPT

## 2022-05-06 PROCEDURE — 99024 POSTOP FOLLOW-UP VISIT: CPT | Performed by: ORTHOPAEDIC SURGERY

## 2022-05-06 RX ORDER — OXYCODONE HYDROCHLORIDE 10 MG/1
10 TABLET ORAL EVERY 4 HOURS PRN
Qty: 28 TABLET | Refills: 0 | Status: SHIPPED | OUTPATIENT
Start: 2022-05-06

## 2022-05-06 RX ORDER — METHOCARBAMOL 750 MG/1
750 TABLET, FILM COATED ORAL EVERY 8 HOURS PRN
Qty: 30 TABLET | Refills: 0 | Status: SHIPPED | OUTPATIENT
Start: 2022-05-06

## 2022-05-06 RX ORDER — NAPROXEN 500 MG/1
500 TABLET ORAL 2 TIMES DAILY WITH MEALS
Qty: 30 TABLET | Refills: 0 | Status: SHIPPED | OUTPATIENT
Start: 2022-05-06 | End: 2022-05-09

## 2022-05-06 NOTE — PROGRESS NOTES
Orthopaedics Office Visit - Post-op Patient Visit    ASSESSMENT/PLAN:    Assessment:   2 5 weeks s/p open reduction internal fixation left tibial plateau fracture   DOS 4/18/22   Patient has not been moving knee, has not been showering with incision      Plan:   - Continue nonweightbearing left lower extremity   - Sutures removed in the office  Patient tolerated well  - Stressed importance of moving knee and performing both passive and active range of motion  [facility] referral made to begin physical therapy  - Over the counter analgesics as needed / directed   - Ice / heat as directed   Followup 4 wks    To Do Next Visit:  Dona Walton    _____________________________________________________  CHIEF COMPLAINT:  Chief Complaint   Patient presents with    Left Knee - Post-op         SUBJECTIVE:  Jacek Olivas is a 31 y o  male who presents  2 5 weeks s/p open reduction internal fixation left tibial plateau fracture   DOS 4/18/22  Patient states he continues to have pain throughout his left lower extremity  The patient states he has been compliant with nonweightbearing restrictions of the left lower extremity  Patient states that in addition to his left knee he has also been experiencing medial-sided ankle pain which has been ongoing the past 2 weeks in duration  Patient states that he has been taking oxycodone as needed for pain  Patient states that in addition to oxycodone he has been smoking "a joint"  With every use of oxycodone as well  Patient states he has been compliant with anticoagulation use  Patient offers no other complaints at this time      SOCIAL HISTORY:  Social History     Tobacco Use    Smoking status: Current Every Day Smoker     Packs/day: 0 25    Smokeless tobacco: Never Used   Vaping Use    Vaping Use: Never used   Substance Use Topics    Alcohol use: Never    Drug use: Yes     Types: Marijuana       MEDICATIONS:    Current Outpatient Medications:     aspirin (ECOTRIN LOW STRENGTH) 81 mg EC tablet, Take 1 tablet (81 mg total) by mouth every 12 (twelve) hours, Disp: 84 tablet, Rfl: 0    oxyCODONE (ROXICODONE) 10 MG TABS, Take 1 tablet (10 mg total) by mouth every 4 (four) hours as needed for moderate pain Max Daily Amount: 60 mg, Disp: 28 tablet, Rfl: 0    naloxone (NARCAN) 4 mg/0 1 mL nasal spray, Administer 1 spray into a nostril  If no response after 2-3 minutes, give another dose in the other nostril using a new spray  (Patient not taking: Reported on 5/6/2022 ), Disp: 1 each, Rfl: 1    naloxone (NARCAN) 4 mg/0 1 mL nasal spray, Administer 1 spray into a nostril  If no response after 2-3 minutes, give another dose in the other nostril using a new spray  (Patient not taking: Reported on 5/6/2022 ), Disp: 1 each, Rfl: 1    oxyCODONE (ROXICODONE) 10 MG TABS, Take 1 tablet (10 mg total) by mouth every 4 (four) hours as needed for moderate pain Max Daily Amount: 60 mg (Patient not taking: Reported on 5/6/2022 ), Disp: 28 tablet, Rfl: 0    oxyCODONE (Roxicodone) 5 immediate release tablet, Take 1 tablet (5 mg total) by mouth every 4 (four) hours as needed for moderate pain Max Daily Amount: 30 mg (Patient not taking: Reported on 5/6/2022 ), Disp: 28 tablet, Rfl: 0    REVIEW OF SYSTEMS:  MSK: left knee pain   Neuro: WNL   Pertinent items are otherwise noted in HPI  A comprehensive review of systems was otherwise negative     _____________________________________________________  PHYSICAL EXAMINATION:  Vital signs: /71   Pulse 71   Ht 6' 2" (1 88 m)   Wt 68 kg (150 lb)   BMI 19 26 kg/m²   General: No acute distress, awake and alert  Psychiatric: Mood and affect appear appropriate  HEENT: Trachea Midline, No torticollis, no apparent facial trauma  Cardiovascular: No audible murmurs;  Extremities appear perfused  Pulmonary: No audible wheezing or stridor  Skin: No open lesions; see further details (if any) below      MUSCULOSKELETAL EXAMINATION:    Left lower extremity examination:  - Patient sitting comfortably in the office in no acute distress   -   Incision sites clean dry and intact with sutures intact  No surrounding erythema or ecchymosis present  No acute visible abnormalities present in the ankle  Extremity appears well-perfused overall   -   Tenderness to palpation noted over the medial lateral tibial plateau, medial ankle  No other bony or soft tissue tenderness to palpation noted at this time  -   Limited range of motion of the left knee in all planes secondary to pain    - NV intact    _____________________________________________________  STUDIES REVIEWED:  I personally reviewed the images and interpretation is as follows:   left knee x-ray two views:   healing tibial plateau fracture in acceptable anatomic alignment with hardware intact  left ankle x-ray 3 views:        PROCEDURES PERFORMED:  Procedures        Cristiano Carlisle PA-C - assisting    Chay Arriola MD            Portions of the record may have been created with voice recognition software  Occasional wrong word or "sound a like" substitutions may have occurred due to the inherent limitations of voice recognition software  Read the chart carefully and recognize, using context, where substitutions have occurred

## 2022-05-08 ENCOUNTER — HOME CARE VISIT (OUTPATIENT)
Dept: HOME HEALTH SERVICES | Facility: HOME HEALTHCARE | Age: 27
End: 2022-05-08

## 2022-05-08 ENCOUNTER — HOME CARE VISIT (OUTPATIENT)
Dept: HOME HEALTH SERVICES | Facility: HOME HEALTHCARE | Age: 27
End: 2022-05-08
Payer: COMMERCIAL

## 2022-05-08 VITALS
SYSTOLIC BLOOD PRESSURE: 128 MMHG | RESPIRATION RATE: 18 BRPM | OXYGEN SATURATION: 99 % | HEIGHT: 72 IN | WEIGHT: 150 LBS | DIASTOLIC BLOOD PRESSURE: 71 MMHG | BODY MASS INDEX: 20.32 KG/M2 | HEART RATE: 72 BPM

## 2022-05-08 PROCEDURE — 400013 VN SOC

## 2022-05-08 PROCEDURE — G0151 HHCP-SERV OF PT,EA 15 MIN: HCPCS

## 2022-05-09 DIAGNOSIS — S82.142A TIBIAL PLATEAU FRACTURE, LEFT, CLOSED, INITIAL ENCOUNTER: ICD-10-CM

## 2022-05-09 RX ORDER — NAPROXEN 500 MG/1
TABLET ORAL
Qty: 30 TABLET | Refills: 0 | Status: SHIPPED | OUTPATIENT
Start: 2022-05-09

## 2022-05-09 NOTE — CASE COMMUNICATION
[facility] has admitted your patient to [address] [place] [address] service with the following disciplines:      Physical  Therapy  This report is informational only, no responses is needed  Primary focus of home health care  Transfer  training  DALE NEIL   and  MELITON MCKEE  Patient stated goals of care  To  be  able  no  drive  and  return  to  work  Anticipated visit pattern and next visit date  #X  per  week  X  1 and  2  X  per  week  X  4  See me dication list - meds in home differ from AVS  None  Significant clinical findings  Pt  requires  assist  for  sit  to  stand  transfers  and  min  assist  for  ambualtion  on  level  surfaces  Left  knee  range  limitied  from  minus  10  degrees  full extension  to  70  degrees  flexion  Potential barriers to goal achievement  None      Thank you for allowing us to participate in the care of your patient

## 2022-05-11 ENCOUNTER — HOME CARE VISIT (OUTPATIENT)
Dept: HOME HEALTH SERVICES | Facility: HOME HEALTHCARE | Age: 27
End: 2022-05-11
Payer: COMMERCIAL

## 2022-05-11 PROCEDURE — G0151 HHCP-SERV OF PT,EA 15 MIN: HCPCS

## 2022-05-13 ENCOUNTER — HOME CARE VISIT (OUTPATIENT)
Dept: HOME HEALTH SERVICES | Facility: HOME HEALTHCARE | Age: 27
End: 2022-05-13
Payer: COMMERCIAL

## 2022-05-13 VITALS — OXYGEN SATURATION: 98 %

## 2022-05-13 PROCEDURE — G0151 HHCP-SERV OF PT,EA 15 MIN: HCPCS

## 2022-05-16 ENCOUNTER — TELEPHONE (OUTPATIENT)
Dept: OBGYN CLINIC | Facility: HOSPITAL | Age: 27
End: 2022-05-16

## 2022-05-16 VITALS — OXYGEN SATURATION: 98 %

## 2022-05-16 DIAGNOSIS — S82.142A TIBIAL PLATEAU FRACTURE, LEFT, CLOSED, INITIAL ENCOUNTER: Primary | ICD-10-CM

## 2022-05-16 RX ORDER — OXYCODONE HYDROCHLORIDE 10 MG/1
10 TABLET ORAL EVERY 4 HOURS PRN
Qty: 30 TABLET | Refills: 0 | Status: SHIPPED | OUTPATIENT
Start: 2022-05-16

## 2022-05-16 NOTE — TELEPHONE ENCOUNTER
Dr Cresencio Zapata  RE: refill     Patient requesting refill:       oxyCODONE (ROXICODONE) 10 MG TABS [[id number]]     Order Details  Dose: 10 mg Route: Oral Frequency: Every 4 hours PRN for moderate pain   Dispense Quantity: 28 tablet Refills: 0          Sig: Take 1 tablet (10 mg total) by mouth every 4 (four) hours as needed for moderate pain Max Daily Amount: 60 mg         Start Date: 05/06/22 End Date: --   Written Date: 05/06/22 Expiration Date: 07/05/22     Pharmacy    [facility],[address] [address]   Phone:  [phone]  Fax:  [phone]

## 2022-05-17 ENCOUNTER — HOME CARE VISIT (OUTPATIENT)
Dept: HOME HEALTH SERVICES | Facility: HOME HEALTHCARE | Age: 27
End: 2022-05-17
Payer: COMMERCIAL

## 2022-05-17 VITALS — HEART RATE: 85 BPM | OXYGEN SATURATION: 98 %

## 2022-05-17 PROCEDURE — G0151 HHCP-SERV OF PT,EA 15 MIN: HCPCS

## 2022-05-20 ENCOUNTER — HOME CARE VISIT (OUTPATIENT)
Dept: HOME HEALTH SERVICES | Facility: HOME HEALTHCARE | Age: 27
End: 2022-05-20
Payer: COMMERCIAL

## 2022-05-20 PROCEDURE — G0180 MD CERTIFICATION HHA PATIENT: HCPCS | Performed by: PHYSICIAN ASSISTANT

## 2022-05-20 PROCEDURE — G0151 HHCP-SERV OF PT,EA 15 MIN: HCPCS

## 2022-05-24 ENCOUNTER — HOME CARE VISIT (OUTPATIENT)
Dept: HOME HEALTH SERVICES | Facility: HOME HEALTHCARE | Age: 27
End: 2022-05-24
Payer: COMMERCIAL

## 2022-05-24 VITALS — HEART RATE: 67 BPM | OXYGEN SATURATION: 97 %

## 2022-05-24 PROCEDURE — G0151 HHCP-SERV OF PT,EA 15 MIN: HCPCS

## 2022-05-27 ENCOUNTER — TELEPHONE (OUTPATIENT)
Dept: OBGYN CLINIC | Facility: HOSPITAL | Age: 27
End: 2022-05-27

## 2022-05-27 DIAGNOSIS — S82.142A TIBIAL PLATEAU FRACTURE, LEFT, CLOSED, INITIAL ENCOUNTER: Primary | ICD-10-CM

## 2022-05-27 RX ORDER — OXYCODONE HYDROCHLORIDE 10 MG/1
10 TABLET ORAL EVERY 4 HOURS PRN
Qty: 28 TABLET | Refills: 0 | Status: SHIPPED | OUTPATIENT
Start: 2022-05-27

## 2022-05-27 NOTE — TELEPHONE ENCOUNTER
Call to this patient no answer left message to inform him that his prescription has been called in to his pharmacy

## 2022-05-27 NOTE — TELEPHONE ENCOUNTER
Patient needs refill, 0 left needs asap    oxyCODONE (ROXICODONE) 10 MG TABS [[id number]]     Order Details  Dose: 10 mg Route: Oral Frequency: Every 4 hours PRN for moderate pain   Dispense Quantity: 30 tablet Refills: 0          Sig: Take 1 tablet (10 mg total) by mouth every 4 (four) hours as needed for moderate pain Max Daily Amount: 60 mg         Start Date: 05/16/22 End Date: --   Written Date: 05/16/22 Expiration Date: 07/15/22   Earliest Fill Date: 05/16/22         Diagnosis Association: Tibial plateau fracture, left, closed, initial encounter (I99 577O)     Providers    Authorizing Provider:    [facility] [address], [address] [address]   Phone:  [phone]   Fax:  [phone]   JONEL #:  [id number]   NPI:  [id number]        Ordering User:  Jordana Rush PA-C    Cosigner:  Karen Deleon MD Signed:  5/16/2022 15:42     [address],[address], [address] [place] [address] [address] [address]   Phone:  [phone]  Fax:  [phone]   JONEL #:  [id number]

## 2022-06-01 DIAGNOSIS — S82.142A TIBIAL PLATEAU FRACTURE, LEFT, CLOSED, INITIAL ENCOUNTER: Primary | ICD-10-CM

## 2022-06-02 ENCOUNTER — HOME CARE VISIT (OUTPATIENT)
Dept: HOME HEALTH SERVICES | Facility: HOME HEALTHCARE | Age: 27
End: 2022-06-02
Payer: COMMERCIAL

## 2022-06-02 VITALS — HEART RATE: 76 BPM | OXYGEN SATURATION: 98 %

## 2022-06-02 PROCEDURE — G0151 HHCP-SERV OF PT,EA 15 MIN: HCPCS

## 2022-06-02 NOTE — CASE COMMUNICATION
Pt being DC from home PT services as goals have been met and pt at current max potential for homecare services given continued NWB status of LLE  Recommending for outpatient PT services once pt able to begin WB and cleared by ortho

## 2022-06-03 ENCOUNTER — HOSPITAL ENCOUNTER (OUTPATIENT)
Dept: RADIOLOGY | Facility: HOSPITAL | Age: 27
Discharge: HOME | End: 2022-06-03
Attending: ORTHOPAEDIC SURGERY
Payer: COMMERCIAL

## 2022-06-03 ENCOUNTER — OFFICE VISIT (OUTPATIENT)
Dept: OBGYN CLINIC | Facility: HOSPITAL | Age: 27
End: 2022-06-03

## 2022-06-03 VITALS
WEIGHT: 150.6 LBS | DIASTOLIC BLOOD PRESSURE: 68 MMHG | BODY MASS INDEX: 20.4 KG/M2 | HEART RATE: 90 BPM | HEIGHT: 72 IN | SYSTOLIC BLOOD PRESSURE: 111 MMHG

## 2022-06-03 DIAGNOSIS — S82.142S CLOSED FRACTURE OF LEFT TIBIAL PLATEAU, SEQUELA: Primary | ICD-10-CM

## 2022-06-03 DIAGNOSIS — Z98.890 S/P ORIF (OPEN REDUCTION INTERNAL FIXATION) FRACTURE: ICD-10-CM

## 2022-06-03 DIAGNOSIS — Z87.81 S/P ORIF (OPEN REDUCTION INTERNAL FIXATION) FRACTURE: ICD-10-CM

## 2022-06-03 DIAGNOSIS — S82.142A TIBIAL PLATEAU FRACTURE, LEFT, CLOSED, INITIAL ENCOUNTER: ICD-10-CM

## 2022-06-03 PROCEDURE — 99024 POSTOP FOLLOW-UP VISIT: CPT | Performed by: ORTHOPAEDIC SURGERY

## 2022-06-03 PROCEDURE — 73560 X-RAY EXAM OF KNEE 1 OR 2: CPT

## 2022-06-03 RX ORDER — NAPROXEN 500 MG/1
500 TABLET ORAL 2 TIMES DAILY WITH MEALS
Qty: 60 TABLET | Refills: 1 | Status: SHIPPED | OUTPATIENT
Start: 2022-06-03

## 2022-06-03 NOTE — PATIENT INSTRUCTIONS
Diagnosis ICD-10-CM Associated Orders   1  Closed fracture of left tibial plateau, sequela  I85 895V    2  S/P ORIF (open reduction internal fixation) fracture  Z98 890     Z87 81      Healing  Continue PT  Remain non-weight bearing left lower extremity  Start naprosyn, one tablet twice a day  Return in about 4 weeks (around 7/1/2022) for re-check with x-rays left knee

## 2022-06-03 NOTE — PROGRESS NOTES
Assessment:   Diagnosis ICD-10-CM Associated Orders   1  Closed fracture of left tibial plateau, sequela  C02 816M    2  S/P ORIF (open reduction internal fixation) fracture  Z98 890     Z87 81        Plan:  X-rays taken and reviewed, physical exam performed  Six weeks status post ORIF left tibial plateau fracture fixation, 04/18/2022  He will remain nonweightbearing for 4 more weeks  Continue physical therapy either home or outpatient to improve motion and strengthening of his left lower extremity  Ice and elevate as indicated  In conjunction with the oxycodone recommend introducing Naprosyn 1 pill twice a day  Can work however must be nonweightbearing on his left lower extremity    To do next visit:  Return in about 4 weeks (around 7/1/2022) for re-check with x-rays left knee  The above stated was discussed in layman's terms and the patient expressed understanding  All questions were answered to the patient's satisfaction  Scribe Attestation    I,:  Jaylen Rizo am acting as a scribe while in the presence of the attending physician :       I,:  Jeannie Rodrigeuz MD personally performed the services described in this documentation    as scribed in my presence :             Subjective:   Maria Esther Hawley is a 31 y o  male who presents repeat evaluation of his left knee  He is now 6 weeks status post ORIF left tibial plateau fracture fixation, 04/18/2022  He has pain  He presents on crutches and states he has been essentially nonweightbearing however at times has been placing his foot down  He takes oxycodone which helps improve his pain to be otherwise tolerable however has pain at night  He denies any calf or thigh pain  He has been receiving home therapy due to not having a 's license  He works in a [employer],   Review of systems negative unless otherwise specified in HPI  Review of Systems    History reviewed  No pertinent past medical history      Past Surgical History:   Procedure Laterality Date    HAND HARDWARE REMOVAL      ORIF TIBIAL PLATEAU Left 8/50/8612    Procedure: OPEN REDUCTION W/ INTERNAL FIXATION (ORIF) LEFT TIBIAL PLATEAU FRACTURE;  Surgeon: Matthew Barrera MD;  Location: [facility];  Service: Orthopedics       History reviewed  No pertinent family history  Social History     Occupational History    Not on file   Tobacco Use    Smoking status: Current Every Day Smoker     Packs/day: 0 25    Smokeless tobacco: Never Used   Vaping Use    Vaping Use: Never used   Substance and Sexual Activity    Alcohol use: Never    Drug use: Yes     Types: Marijuana    Sexual activity: Not on file         Current Outpatient Medications:     aspirin (ECOTRIN LOW STRENGTH) 81 mg EC tablet, Take 1 tablet (81 mg total) by mouth every 12 (twelve) hours, Disp: 84 tablet, Rfl: 0    methocarbamol (Robaxin-750) 750 mg tablet, Take 1 tablet (750 mg total) by mouth every 8 (eight) hours as needed for muscle spasms, Disp: 30 tablet, Rfl: 0    naproxen (EC NAPROSYN) 500 MG EC tablet, TAKE 1 TABLET(500 MG) BY MOUTH TWICE DAILY WITH MEALS, Disp: 30 tablet, Rfl: 0    oxyCODONE (ROXICODONE) 10 MG TABS, Take 1 tablet (10 mg total) by mouth every 4 (four) hours as needed for moderate pain Max Daily Amount: 60 mg, Disp: 28 tablet, Rfl: 0    naloxone (NARCAN) 4 mg/0 1 mL nasal spray, Administer 1 spray into a nostril  If no response after 2-3 minutes, give another dose in the other nostril using a new spray  (Patient not taking: No sig reported), Disp: 1 each, Rfl: 1    naloxone (NARCAN) 4 mg/0 1 mL nasal spray, Administer 1 spray into a nostril  If no response after 2-3 minutes, give another dose in the other nostril using a new spray   (Patient not taking: No sig reported), Disp: 1 each, Rfl: 1    oxyCODONE (ROXICODONE) 10 MG TABS, Take 1 tablet (10 mg total) by mouth every 4 (four) hours as needed for moderate pain Max Daily Amount: 60 mg (Patient not taking: No sig reported), Disp: 28 tablet, Rfl: 0    oxyCODONE (ROXICODONE) 10 MG TABS, Take 1 tablet (10 mg total) by mouth every 4 (four) hours as needed for moderate pain Max Daily Amount: 60 mg (Patient not taking: Reported on 6/3/2022), Disp: 28 tablet, Rfl: 0    oxyCODONE (ROXICODONE) 10 MG TABS, Take 1 tablet (10 mg total) by mouth every 4 (four) hours as needed for moderate pain Max Daily Amount: 60 mg (Patient not taking: Reported on 6/3/2022), Disp: 28 tablet, Rfl: 0    oxyCODONE (ROXICODONE) 10 MG TABS, Take 1 tablet (10 mg total) by mouth every 4 (four) hours as needed for moderate pain Max Daily Amount: 60 mg (Patient not taking: Reported on 6/3/2022), Disp: 30 tablet, Rfl: 0    oxyCODONE (Roxicodone) 5 immediate release tablet, Take 1 tablet (5 mg total) by mouth every 4 (four) hours as needed for moderate pain Max Daily Amount: 30 mg (Patient not taking: No sig reported), Disp: 28 tablet, Rfl: 0    No Known Allergies         Vitals:    06/03/22 1051   BP: 111/68   Pulse: 90       Objective:                    Left Knee Exam     Range of Motion   Extension: 20 (passively to 5)   Flexion: 90 (passively to 110)     Other   Erythema: absent  Sensation: normal  Swelling: mild  Effusion: no effusion present    Comments:    Healed incision  Mild quadriceps and calf atrophy            Diagnostics, reviewed and taken today if performed as documented: The attending physician has personally reviewed the pertinent films in PACS and interpretation is as follows:    Left knee x-rays taken and reviewed in the office today and show: healing bicondylar tibial plateau fracture status post fracture fixation with hardware in excellent position and alignment  No signs of hardware failure  Procedures, if performed today:    Procedures    None performed      Portions of the record may have been created with voice recognition software    Occasional wrong word or "sound a like" substitutions may have occurred due to the inherent limitations of voice recognition software  Read the chart carefully and recognize, using context, where substitutions have occurred

## 2022-06-26 DIAGNOSIS — Z87.81 S/P ORIF (OPEN REDUCTION INTERNAL FIXATION) FRACTURE: Primary | ICD-10-CM

## 2022-06-26 DIAGNOSIS — Z98.890 S/P ORIF (OPEN REDUCTION INTERNAL FIXATION) FRACTURE: Primary | ICD-10-CM

## 2022-07-08 ENCOUNTER — OFFICE VISIT (OUTPATIENT)
Dept: OBGYN CLINIC | Facility: HOSPITAL | Age: 27
End: 2022-07-08

## 2022-07-08 ENCOUNTER — HOSPITAL ENCOUNTER (OUTPATIENT)
Dept: RADIOLOGY | Facility: HOSPITAL | Age: 27
Discharge: HOME | End: 2022-07-08
Attending: ORTHOPAEDIC SURGERY
Payer: COMMERCIAL

## 2022-07-08 VITALS
BODY MASS INDEX: 19.13 KG/M2 | WEIGHT: 141.2 LBS | HEART RATE: 60 BPM | DIASTOLIC BLOOD PRESSURE: 67 MMHG | SYSTOLIC BLOOD PRESSURE: 105 MMHG | HEIGHT: 72 IN

## 2022-07-08 DIAGNOSIS — Z98.890 S/P ORIF (OPEN REDUCTION INTERNAL FIXATION) FRACTURE: ICD-10-CM

## 2022-07-08 DIAGNOSIS — Z87.81 S/P ORIF (OPEN REDUCTION INTERNAL FIXATION) FRACTURE: Primary | ICD-10-CM

## 2022-07-08 DIAGNOSIS — S82.142A TIBIAL PLATEAU FRACTURE, LEFT, CLOSED, INITIAL ENCOUNTER: ICD-10-CM

## 2022-07-08 DIAGNOSIS — Z87.81 S/P ORIF (OPEN REDUCTION INTERNAL FIXATION) FRACTURE: ICD-10-CM

## 2022-07-08 DIAGNOSIS — Z98.890 S/P ORIF (OPEN REDUCTION INTERNAL FIXATION) FRACTURE: Primary | ICD-10-CM

## 2022-07-08 PROCEDURE — 99024 POSTOP FOLLOW-UP VISIT: CPT | Performed by: ORTHOPAEDIC SURGERY

## 2022-07-08 PROCEDURE — 73560 X-RAY EXAM OF KNEE 1 OR 2: CPT

## 2022-07-08 NOTE — PROGRESS NOTES
Orthopaedics Office Visit - Post-op Patient Visit    ASSESSMENT/PLAN:    Assessment:   ORIF left tibial plateau fracture fixation, 04/18/2022  Minimal pain, requests to go back to work    Plan:   Patient is progressing nicely on exam today  Patient may return to full duty 7/11/22    To Do Next Visit:  Left knee x-rays    _____________________________________________________  CHIEF COMPLAINT:  Chief Complaint   Patient presents with    Left Knee - Post-op         SUBJECTIVE:  Constance Gray is a 31 y o  male who presents 11 weeks ORIF left tibial plateau fracture fixation, 04/18/2022  Patient reports overall he is doing well  Patient has occasional pain with prolonged WB  Patient would like to return to work  SOCIAL HISTORY:  Social History     Tobacco Use    Smoking status: Current Every Day Smoker     Packs/day: 0 25    Smokeless tobacco: Never Used   Vaping Use    Vaping Use: Never used   Substance Use Topics    Alcohol use: Never    Drug use: Yes     Types: Marijuana       MEDICATIONS:    Current Outpatient Medications:     aspirin (ECOTRIN LOW STRENGTH) 81 mg EC tablet, Take 1 tablet (81 mg total) by mouth every 12 (twelve) hours, Disp: 84 tablet, Rfl: 0    methocarbamol (Robaxin-750) 750 mg tablet, Take 1 tablet (750 mg total) by mouth every 8 (eight) hours as needed for muscle spasms, Disp: 30 tablet, Rfl: 0    naproxen (Naprosyn) 500 mg tablet, Take 1 tablet (500 mg total) by mouth 2 (two) times a day with meals, Disp: 60 tablet, Rfl: 1    naloxone (NARCAN) 4 mg/0 1 mL nasal spray, Administer 1 spray into a nostril  If no response after 2-3 minutes, give another dose in the other nostril using a new spray  (Patient not taking: No sig reported), Disp: 1 each, Rfl: 1    naloxone (NARCAN) 4 mg/0 1 mL nasal spray, Administer 1 spray into a nostril  If no response after 2-3 minutes, give another dose in the other nostril using a new spray   (Patient not taking: No sig reported), Disp: 1 each, Rfl: 1    naproxen (EC NAPROSYN) 500 MG EC tablet, TAKE 1 TABLET(500 MG) BY MOUTH TWICE DAILY WITH MEALS (Patient not taking: Reported on 7/8/2022), Disp: 30 tablet, Rfl: 0    oxyCODONE (ROXICODONE) 10 MG TABS, Take 1 tablet (10 mg total) by mouth every 4 (four) hours as needed for moderate pain Max Daily Amount: 60 mg (Patient not taking: No sig reported), Disp: 28 tablet, Rfl: 0    oxyCODONE (ROXICODONE) 10 MG TABS, Take 1 tablet (10 mg total) by mouth every 4 (four) hours as needed for moderate pain Max Daily Amount: 60 mg (Patient not taking: No sig reported), Disp: 28 tablet, Rfl: 0    oxyCODONE (ROXICODONE) 10 MG TABS, Take 1 tablet (10 mg total) by mouth every 4 (four) hours as needed for moderate pain Max Daily Amount: 60 mg (Patient not taking: No sig reported), Disp: 28 tablet, Rfl: 0    oxyCODONE (ROXICODONE) 10 MG TABS, Take 1 tablet (10 mg total) by mouth every 4 (four) hours as needed for moderate pain Max Daily Amount: 60 mg (Patient not taking: No sig reported), Disp: 30 tablet, Rfl: 0    oxyCODONE (ROXICODONE) 10 MG TABS, Take 1 tablet (10 mg total) by mouth every 4 (four) hours as needed for moderate pain Max Daily Amount: 60 mg (Patient not taking: Reported on 7/8/2022), Disp: 28 tablet, Rfl: 0    oxyCODONE (Roxicodone) 5 immediate release tablet, Take 1 tablet (5 mg total) by mouth every 4 (four) hours as needed for moderate pain Max Daily Amount: 30 mg (Patient not taking: No sig reported), Disp: 28 tablet, Rfl: 0    REVIEW OF SYSTEMS:  MSK: see below  Neuro: NVI  Pertinent items are otherwise noted in HPI    A comprehensive review of systems was otherwise negative     _____________________________________________________  PHYSICAL EXAMINATION:  Vital signs: /67   Pulse 60   Ht 6' (1 829 m)   Wt 64 kg (141 lb 3 2 oz)   BMI 19 15 kg/m²   General: No acute distress, awake and alert  Psychiatric: Mood and affect appear appropriate  HEENT: Trachea Midline, No torticollis, no apparent facial trauma  Cardiovascular: No audible murmurs; Extremities appear perfused  Pulmonary: No audible wheezing or stridor  Skin: No open lesions; see further details (if any) below    MUSCULOSKELETAL EXAMINATION:   Left Knee Exam      Range of Motion   Extension:0  Flexion: 100     Other   Erythema: absent  Sensation: normal  Swelling: mild  Effusion: no effusion present     Comments:    Healed incision  Mild quadriceps and calf atrophy      _____________________________________________________  STUDIES REVIEWED:  I personally reviewed the images and interpretation is as follows:   Left knee x-rays demonstrates:  healed tibial plateau fracture status post fracture fixation with hardware in excellent position and alignment  No signs of hardware failure       PROCEDURES PERFORMED:  Procedures    Mal Vance     Scribe Attestation    I,:  Mal Vance am acting as a scribe while in the presence of the attending physician :       I,:  Supriya Lee MD personally performed the services described in this documentation    as scribed in my presence :

## 2022-07-08 NOTE — LETTER
July 8, 2022     Patient: Cornelio Mcdaniel  YOB: 1995  Date of Visit: 7/8/2022      To Whom it May Concern:    Cherelle Perez is under my professional care  Goran Cardoza was seen in my office on 7/8/2022  Goran Cardoza may return to full duty on 7/11/22    If you have any questions or concerns, please don't hesitate to call           Sincerely,          Claudia Wolf MD        CC: No Recipients

## 2022-09-28 ENCOUNTER — TELEPHONE (OUTPATIENT)
Dept: OBGYN CLINIC | Facility: HOSPITAL | Age: 27
End: 2022-09-28

## 2022-09-28 NOTE — TELEPHONE ENCOUNTER
LM to inform pt that Dr Pineda Chicas would not be available for appt on 10/7  Asked that pt please call back in order to reschedule their appt

## 2022-10-09 DIAGNOSIS — Z87.81 S/P ORIF (OPEN REDUCTION INTERNAL FIXATION) FRACTURE: Primary | ICD-10-CM

## 2022-10-09 DIAGNOSIS — Z98.890 S/P ORIF (OPEN REDUCTION INTERNAL FIXATION) FRACTURE: Primary | ICD-10-CM

## 2023-01-18 ENCOUNTER — HOSPITAL ENCOUNTER (OUTPATIENT)
Dept: RADIOLOGY | Facility: HOSPITAL | Age: 28
Discharge: HOME | End: 2023-01-18
Attending: ORTHOPAEDIC SURGERY

## 2023-01-18 ENCOUNTER — OFFICE VISIT (OUTPATIENT)
Dept: OBGYN CLINIC | Facility: HOSPITAL | Age: 28
End: 2023-01-18

## 2023-01-18 VITALS
BODY MASS INDEX: 19 KG/M2 | HEIGHT: 72 IN | SYSTOLIC BLOOD PRESSURE: 112 MMHG | WEIGHT: 140.3 LBS | DIASTOLIC BLOOD PRESSURE: 71 MMHG | HEART RATE: 68 BPM

## 2023-01-18 DIAGNOSIS — Z87.81 S/P ORIF (OPEN REDUCTION INTERNAL FIXATION) FRACTURE: ICD-10-CM

## 2023-01-18 DIAGNOSIS — Z98.890 S/P ORIF (OPEN REDUCTION INTERNAL FIXATION) FRACTURE: ICD-10-CM

## 2023-01-18 DIAGNOSIS — Z98.890 S/P ORIF (OPEN REDUCTION INTERNAL FIXATION) FRACTURE: Primary | ICD-10-CM

## 2023-01-18 DIAGNOSIS — S82.142A TIBIAL PLATEAU FRACTURE, LEFT, CLOSED, INITIAL ENCOUNTER: ICD-10-CM

## 2023-01-18 DIAGNOSIS — Z87.81 S/P ORIF (OPEN REDUCTION INTERNAL FIXATION) FRACTURE: Primary | ICD-10-CM

## 2023-01-18 RX ORDER — TRAMADOL HYDROCHLORIDE 50 MG/1
50 TABLET ORAL EVERY 8 HOURS PRN
Qty: 30 TABLET | Refills: 0 | Status: SHIPPED | OUTPATIENT
Start: 2023-01-18

## 2023-01-18 NOTE — PROGRESS NOTES
Orthopaedics Office Visit - Post-op Patient Visit    ASSESSMENT/PLAN:    Assessment:   ORIF left tibial plateau fracture fixation, 04/18/2022  Painful lateral hardware   Possible lateral meniscus tear , patient sustained new injury where he felt pop and now has mechanical symptoms        Plan:   - Weight bearing as tolerated left lower extremity   - MRI of the left knee to evaluate for meniscus tear , metal reducing sequence  - Begin physical therapy as directed (home health)  - Over the counter analgesics as needed / directed   - Ice / heat as directed   -Discussed possibly surgical invention in future pending symptoms and MRI results  -Prescription provided for patient for tramadol  -Follow-up after MRI performed         To Do Next Visit:  Evaluate MRI     _____________________________________________________  CHIEF COMPLAINT:  Chief Complaint   Patient presents with   • Left Knee - Follow-up         SUBJECTIVE:  Chuyita Navarro is a 28 y o  male who presents ORIF left tibial plateau fracture fixation, 04/18/2022  Patient states that he does continue to have pain throughout the knee in particular of the lateral aspect of the knee that becomes worse with prolonged weightbearing and range of motion of the knee, twisting  Patient states that he sustained an injury approximately 2 weeks ago from flexing his knee "too much"  He states that he also had a contusion of the knee a few weeks ago resulting in increased pain  Patient states that he has been taking Aleve as needed for pain which provides minimal relief of symptoms  Patient has been ambulating with use of a single-point cane  Patient offers no other complaints at this time        SOCIAL HISTORY:  Social History     Tobacco Use   • Smoking status: Every Day     Packs/day: 0 25     Types: Cigarettes   • Smokeless tobacco: Never   Vaping Use   • Vaping Use: Never used   Substance Use Topics   • Alcohol use: Never   • Drug use: Yes     Types: Marijuana MEDICATIONS:    Current Outpatient Medications:   •  aspirin (ECOTRIN LOW STRENGTH) 81 mg EC tablet, Take 1 tablet (81 mg total) by mouth every 12 (twelve) hours, Disp: 84 tablet, Rfl: 0  •  methocarbamol (Robaxin-750) 750 mg tablet, Take 1 tablet (750 mg total) by mouth every 8 (eight) hours as needed for muscle spasms, Disp: 30 tablet, Rfl: 0  •  naloxone (NARCAN) 4 mg/0 1 mL nasal spray, Administer 1 spray into a nostril  If no response after 2-3 minutes, give another dose in the other nostril using a new spray  (Patient not taking: Reported on 5/6/2022), Disp: 1 each, Rfl: 1  •  naloxone (NARCAN) 4 mg/0 1 mL nasal spray, Administer 1 spray into a nostril  If no response after 2-3 minutes, give another dose in the other nostril using a new spray   (Patient not taking: Reported on 5/6/2022), Disp: 1 each, Rfl: 1  •  naproxen (EC NAPROSYN) 500 MG EC tablet, TAKE 1 TABLET(500 MG) BY MOUTH TWICE DAILY WITH MEALS (Patient not taking: Reported on 7/8/2022), Disp: 30 tablet, Rfl: 0  •  naproxen (Naprosyn) 500 mg tablet, Take 1 tablet (500 mg total) by mouth 2 (two) times a day with meals, Disp: 60 tablet, Rfl: 1  •  oxyCODONE (ROXICODONE) 10 MG TABS, Take 1 tablet (10 mg total) by mouth every 4 (four) hours as needed for moderate pain Max Daily Amount: 60 mg (Patient not taking: Reported on 5/6/2022), Disp: 28 tablet, Rfl: 0  •  oxyCODONE (ROXICODONE) 10 MG TABS, Take 1 tablet (10 mg total) by mouth every 4 (four) hours as needed for moderate pain Max Daily Amount: 60 mg (Patient not taking: Reported on 6/3/2022), Disp: 28 tablet, Rfl: 0  •  oxyCODONE (ROXICODONE) 10 MG TABS, Take 1 tablet (10 mg total) by mouth every 4 (four) hours as needed for moderate pain Max Daily Amount: 60 mg (Patient not taking: Reported on 6/3/2022), Disp: 28 tablet, Rfl: 0  •  oxyCODONE (ROXICODONE) 10 MG TABS, Take 1 tablet (10 mg total) by mouth every 4 (four) hours as needed for moderate pain Max Daily Amount: 60 mg (Patient not taking: Reported on 6/3/2022), Disp: 30 tablet, Rfl: 0  •  oxyCODONE (ROXICODONE) 10 MG TABS, Take 1 tablet (10 mg total) by mouth every 4 (four) hours as needed for moderate pain Max Daily Amount: 60 mg (Patient not taking: Reported on 7/8/2022), Disp: 28 tablet, Rfl: 0  •  oxyCODONE (Roxicodone) 5 immediate release tablet, Take 1 tablet (5 mg total) by mouth every 4 (four) hours as needed for moderate pain Max Daily Amount: 30 mg (Patient not taking: Reported on 5/6/2022), Disp: 28 tablet, Rfl: 0    REVIEW OF SYSTEMS:  MSK: left knee pain   Neuro: WNL   Pertinent items are otherwise noted in HPI  A comprehensive review of systems was otherwise negative     _____________________________________________________  PHYSICAL EXAMINATION:  Vital signs: /71   Pulse 68   Ht 6' (1 829 m)   Wt 63 6 kg (140 lb 4 8 oz)   BMI 19 03 kg/m²   General: No acute distress, awake and alert  Psychiatric: Mood and affect appear appropriate  HEENT: Trachea Midline, No torticollis, no apparent facial trauma  Cardiovascular: No audible murmurs; Extremities appear perfused  Pulmonary: No audible wheezing or stridor  Skin: No open lesions; see further details (if any) below      MUSCULOSKELETAL EXAMINATION:  Left knee examination:  - Patient sitting comfortably in the office in no acute distress   - Healed incision sites medial and lateral knee with no acute visible abnormalities present   - TTP noted over the lateral joint line and lateral hardware  No other bony or soft tissue tenderness palpation noted at this time   -0 to 95 degrees range of motion limited by pain  - Special Tests       -Positive Monet's examination laterally  - NV intact    _____________________________________________________  STUDIES REVIEWED:  I personally reviewed the images and interpretation is as follows:  Left knee XR 2 views:  Healed tibial plateau fracture with hardware intact   Mild lateral joint space narrowing         PROCEDURES PERFORMED:  No procedures were performed at this time  Fidencio Alexandra PA-C - assisting  Ash Damon                        Portions of the record may have been created with voice recognition software  Occasional wrong word or "sound a like" substitutions may have occurred due to the inherent limitations of voice recognition software  Read the chart carefully and recognize, using context, where substitutions have occurred

## 2023-01-18 NOTE — PATIENT INSTRUCTIONS
- Weight bearing as tolerated left lower extremity   - MRI of the left knee to evaluate for meniscus tear   - Begin physical therapy as directed (home health)  - Over the counter analgesics as needed / directed   - Ice / heat as directed   -Discussed possibly surgical invention in future pending symptoms and MRI results  -Prescription provided for patient for tramadol  -Follow-up after MRI performed

## 2023-01-19 ENCOUNTER — TELEPHONE (OUTPATIENT)
Dept: OBGYN CLINIC | Facility: HOSPITAL | Age: 28
End: 2023-01-19

## 2023-01-19 NOTE — TELEPHONE ENCOUNTER
Isaac Moreland Not sure if you recall  I spoke to the patient and he mentioned in the exam room about the SSI (Welfare forms) paperwork needs to be completed  I advised patient to fax the forms or stop at the one of the Ortho sites to complete  Per patient has an appointment 1/23/23 for the SSI

## 2023-01-19 NOTE — TELEPHONE ENCOUNTER
Caller: Patient    Laila Chong    Reason for call: patient states at his appt yesterday he was advised someone would be calling him about social security paperwork   Please advise    Call back#: [phone]

## 2024-03-22 ENCOUNTER — HOSPITAL ENCOUNTER (EMERGENCY)
Facility: HOSPITAL | Age: 29
Discharge: HOME | End: 2024-03-22
Attending: EMERGENCY MEDICINE
Payer: COMMERCIAL

## 2024-03-22 VITALS
WEIGHT: 160.3 LBS | OXYGEN SATURATION: 97 % | DIASTOLIC BLOOD PRESSURE: 78 MMHG | BODY MASS INDEX: 21.74 KG/M2 | SYSTOLIC BLOOD PRESSURE: 137 MMHG | HEART RATE: 98 BPM | TEMPERATURE: 98 F | RESPIRATION RATE: 18 BRPM

## 2024-03-22 DIAGNOSIS — B34.9 VIRAL ILLNESS: Primary | ICD-10-CM

## 2024-03-22 DIAGNOSIS — R51.9 HEADACHE: ICD-10-CM

## 2024-03-22 LAB — S PYO DNA THROAT QL NAA+PROBE: NOT DETECTED

## 2024-03-22 PROCEDURE — 99284 EMERGENCY DEPT VISIT MOD MDM: CPT | Performed by: EMERGENCY MEDICINE

## 2024-03-22 PROCEDURE — 87651 STREP A DNA AMP PROBE: CPT | Performed by: EMERGENCY MEDICINE

## 2024-03-22 PROCEDURE — 87636 SARSCOV2 & INF A&B AMP PRB: CPT | Performed by: EMERGENCY MEDICINE

## 2024-03-22 PROCEDURE — 99283 EMERGENCY DEPT VISIT LOW MDM: CPT

## 2024-03-22 RX ORDER — FLUTICASONE PROPIONATE 50 MCG
1 SPRAY, SUSPENSION (ML) NASAL DAILY
Qty: 16 G | Refills: 0 | Status: SHIPPED | OUTPATIENT
Start: 2024-03-22

## 2024-03-22 RX ORDER — IBUPROFEN 400 MG/1
800 TABLET ORAL ONCE
Status: COMPLETED | OUTPATIENT
Start: 2024-03-22 | End: 2024-03-22

## 2024-03-22 RX ORDER — IBUPROFEN 800 MG/1
800 TABLET ORAL 3 TIMES DAILY
Qty: 21 TABLET | Refills: 0 | Status: SHIPPED | OUTPATIENT
Start: 2024-03-22

## 2024-03-22 RX ADMIN — IBUPROFEN 800 MG: 400 TABLET ORAL at 08:04

## 2024-03-22 NOTE — ED PROVIDER NOTES
History  Chief Complaint   Patient presents with    Flu Symptoms     Fever, chills, headache, body aches X2 days.      3 days of viral-like illness complaining of left-sided headache scratchy throat body aches subjective fevers no vomiting diarrhea no rash no chest pain or shortness of breath or cough is having some sinus congestion  Patient was just taking Advil occasionally like once a day last was yesterday      Flu Symptoms  Presenting symptoms: fever, headache and sore throat    Presenting symptoms: no cough, no shortness of breath and no vomiting    Associated symptoms: no chills and no ear pain        Prior to Admission Medications   Prescriptions Last Dose Informant Patient Reported? Taking?   aspirin (ECOTRIN LOW STRENGTH) 81 mg EC tablet Not Taking  No No   Sig: Take 1 tablet (81 mg total) by mouth every 12 (twelve) hours   Patient not taking: Reported on 3/22/2024   methocarbamol (Robaxin-750) 750 mg tablet Not Taking  No No   Sig: Take 1 tablet (750 mg total) by mouth every 8 (eight) hours as needed for muscle spasms   Patient not taking: Reported on 3/22/2024   naloxone (NARCAN) 4 mg/0.1 mL nasal spray   No No   Sig: Administer 1 spray into a nostril. If no response after 2-3 minutes, give another dose in the other nostril using a new spray.   Patient not taking: Reported on 5/6/2022   naloxone (NARCAN) 4 mg/0.1 mL nasal spray   No No   Sig: Administer 1 spray into a nostril. If no response after 2-3 minutes, give another dose in the other nostril using a new spray.   Patient not taking: Reported on 5/6/2022   naproxen (EC NAPROSYN) 500 MG EC tablet   No No   Sig: TAKE 1 TABLET(500 MG) BY MOUTH TWICE DAILY WITH MEALS   Patient not taking: Reported on 7/8/2022   naproxen (Naprosyn) 500 mg tablet   No No   Sig: Take 1 tablet (500 mg total) by mouth 2 (two) times a day with meals   oxyCODONE (ROXICODONE) 10 MG TABS   No No   Sig: Take 1 tablet (10 mg total) by mouth every 4 (four) hours as needed for  moderate pain Max Daily Amount: 60 mg   Patient not taking: Reported on 5/6/2022   oxyCODONE (ROXICODONE) 10 MG TABS   No No   Sig: Take 1 tablet (10 mg total) by mouth every 4 (four) hours as needed for moderate pain Max Daily Amount: 60 mg   Patient not taking: Reported on 6/3/2022   oxyCODONE (ROXICODONE) 10 MG TABS   No No   Sig: Take 1 tablet (10 mg total) by mouth every 4 (four) hours as needed for moderate pain Max Daily Amount: 60 mg   Patient not taking: Reported on 6/3/2022   oxyCODONE (ROXICODONE) 10 MG TABS   No No   Sig: Take 1 tablet (10 mg total) by mouth every 4 (four) hours as needed for moderate pain Max Daily Amount: 60 mg   Patient not taking: Reported on 6/3/2022   oxyCODONE (ROXICODONE) 10 MG TABS   No No   Sig: Take 1 tablet (10 mg total) by mouth every 4 (four) hours as needed for moderate pain Max Daily Amount: 60 mg   Patient not taking: Reported on 7/8/2022   oxyCODONE (Roxicodone) 5 immediate release tablet   No No   Sig: Take 1 tablet (5 mg total) by mouth every 4 (four) hours as needed for moderate pain Max Daily Amount: 30 mg   Patient not taking: Reported on 5/6/2022   traMADol (Ultram) 50 mg tablet Not Taking  No No   Sig: Take 1 tablet (50 mg total) by mouth every 8 (eight) hours as needed for moderate pain   Patient not taking: Reported on 3/22/2024      Facility-Administered Medications: None       History reviewed. No pertinent past medical history.    Past Surgical History:   Procedure Laterality Date    HAND HARDWARE REMOVAL      ORIF TIBIAL PLATEAU Left 4/18/2022    Procedure: OPEN REDUCTION W/ INTERNAL FIXATION (ORIF) LEFT TIBIAL PLATEAU FRACTURE;  Surgeon: Salomon Damon MD;  Location: [facility];  Service: Orthopedics       History reviewed. No pertinent family history.  I have reviewed and agree with the history as documented.    E-Cigarette/Vaping    E-Cigarette Use Never User      E-Cigarette/Vaping Substances    Nicotine No     THC No     CBD No     Flavoring No      Other No     Unknown No      Social History     Tobacco Use    Smoking status: Every Day     Current packs/day: 0.25     Types: Cigarettes    Smokeless tobacco: Never   Vaping Use    Vaping status: Never Used   Substance Use Topics    Alcohol use: Never    Drug use: Yes     Types: Marijuana       Review of Systems   Constitutional:  Positive for fever. Negative for chills.   HENT:  Positive for sore throat. Negative for ear pain.    Eyes:  Negative for photophobia, redness and visual disturbance.   Respiratory:  Negative for cough and shortness of breath.    Cardiovascular:  Negative for chest pain and palpitations.   Gastrointestinal:  Negative for abdominal pain and vomiting.   Musculoskeletal:  Negative for arthralgias and back pain.   Skin:  Negative for color change and rash.   Neurological:  Positive for headaches. Negative for seizures, syncope, facial asymmetry, weakness and numbness.   Psychiatric/Behavioral:  Negative for confusion.    All other systems reviewed and are negative.      Physical Exam  Physical Exam  Vitals and nursing note reviewed.   Constitutional:       General: He is not in acute distress.     Appearance: He is well-developed.   HENT:      Head: Normocephalic and atraumatic.      Nose: Nose normal.      Mouth/Throat:      Mouth: Mucous membranes are moist.      Pharynx: No oropharyngeal exudate or posterior oropharyngeal erythema.      Comments: Posterior pharynx no erythema no exudate uvula is midline no dysphonia  Eyes:      Extraocular Movements: Extraocular movements intact.      Conjunctiva/sclera: Conjunctivae normal.      Pupils: Pupils are equal, round, and reactive to light.   Cardiovascular:      Rate and Rhythm: Normal rate and regular rhythm.      Heart sounds: No murmur heard.  Pulmonary:      Effort: Pulmonary effort is normal. No respiratory distress.      Breath sounds: Normal breath sounds.   Abdominal:      Palpations: Abdomen is soft.      Tenderness: There is no  abdominal tenderness.   Musculoskeletal:         General: No swelling.      Cervical back: Normal range of motion. Rigidity present. No tenderness.   Lymphadenopathy:      Cervical: No cervical adenopathy.   Skin:     General: Skin is warm and dry.      Capillary Refill: Capillary refill takes less than 2 seconds.      Coloration: Skin is not jaundiced.   Neurological:      General: No focal deficit present.      Mental Status: He is alert and oriented to person, place, and time.      Cranial Nerves: No cranial nerve deficit.      Sensory: No sensory deficit.      Motor: No weakness.      Coordination: Coordination normal.      Gait: Gait normal.   Psychiatric:         Mood and Affect: Mood normal.         Thought Content: Thought content normal.         Vital Signs  ED Triage Vitals [03/22/24 0755]   Temperature Pulse Respirations Blood Pressure SpO2   98 °F (36.7 °C) 98 18 137/78 97 %      Temp Source Heart Rate Source Patient Position - Orthostatic VS BP Location FiO2 (%)   Tympanic Monitor Sitting Left arm --      Pain Score       --           Vitals:    03/22/24 0755   BP: 137/78   Pulse: 98   Patient Position - Orthostatic VS: Sitting         Visual Acuity      ED Medications  Medications   ibuprofen (MOTRIN) tablet 800 mg (800 mg Oral Given 3/22/24 0804)       Diagnostic Studies  Results Reviewed       Procedure Component Value Units Date/Time    FLU/COVID - if FLU clinically relevant [[id number]] Collected: 03/22/24 0804    Lab Status: No result Specimen: Nares from Nose     Strep A PCR [[id number]] Collected: 03/22/24 0804    Lab Status: No result Specimen: Throat                    No orders to display              Procedures  Procedures         ED Course                               SBIRT 20yo+      Flowsheet Row Most Recent Value   Initial Alcohol Screen: US AUDIT-C     1. How often do you have a drink containing alcohol? 0 Filed at: 03/22/2024 0755   2. How many drinks containing alcohol do you have on a  typical day you are drinking?  0 Filed at: 03/22/2024 0755   3a. Male UNDER 65: How often do you have five or more drinks on one occasion? 0 Filed at: 03/22/2024 0755   Audit-C Score 0 Filed at: 03/22/2024 0755   SUHAS: How many times in the past year have you...    Used an illegal drug or used a prescription medication for non-medical reasons? Never Filed at: 03/22/2024 0755                      Medical Decision Making  With viral-like illness COVID and flu sent as well as strep however he has 1 out of 4 Cent[employer]'s criteria for strep only clinically not strep  No airway compromise no respiratory distress neurologically intact clinically this is not meningitis or subarachnoid hemorrhage             Disposition  Final diagnoses:   Viral illness   Headache     Time reflects when diagnosis was documented in both MDM as applicable and the Disposition within this note       Time User Action Codes Description Comment    3/22/2024  8:05 AM Moises Delgado Add [B34.9] Viral illness     3/22/2024  8:05 AM Moises Delgado Add [R51.9] Headache           ED Disposition       ED Disposition   Discharge    Condition   Stable    Date/Time   Fri Mar 22, 2024 0805    Comment   Andi WILSON Morris discharge to home/self care.                   Follow-up Information       Follow up With Specialties Details Why Contact Info    Timoteo De Leon MD Internal Medicine In 3 days  [address] [employer] [id number] [phone]              Patient's Medications   Discharge Prescriptions    FLUTICASONE (FLONASE) 50 MCG/ACT NASAL SPRAY    1 spray into each nostril daily       Start Date: 3/22/2024 End Date: --       Order Dose: 1 spray       Quantity: 16 g    Refills: 0    IBUPROFEN (MOTRIN) 800 MG TABLET    Take 1 tablet (800 mg total) by mouth 3 (three) times a day       Start Date: 3/22/2024 End Date: --       Order Dose: 800 mg       Quantity: 21 tablet    Refills: 0       No discharge procedures on file.    PDMP Review         Value Time User     PDMP Reviewed  Yes 1/18/2023 11:23 AM Jamar Cuellar PA-C            ED Provider  Electronically Signed by             Moises Delgado MD  03/22/24 0807

## 2024-03-22 NOTE — Clinical Note
Andi Morris was seen and treated in our emergency department on 3/22/2024.                Diagnosis:     Andi  .    He may return on this date: 03/24/2024         If you have any questions or concerns, please don't hesitate to call.      Moises Delgado MD    ______________________________           _______________          _______________  Hospital Representative                              Date                                Time

## 2024-03-23 LAB
FLUAV RNA RESP QL NAA+PROBE: NEGATIVE
FLUBV RNA RESP QL NAA+PROBE: NEGATIVE
SARS-COV-2 RNA RESP QL NAA+PROBE: NEGATIVE

## (undated) DEVICE — GLOVE SRG BIOGEL ORTHOPEDIC 8

## (undated) DEVICE — GLOVE INDICATOR PI UNDERGLOVE SZ 8 BLUE

## (undated) DEVICE — SUT VICRYL PLUS 0 CTB-1 27 IN VCPB260H

## (undated) DEVICE — 3.5MM DRILL BIT/QC/110MM

## (undated) DEVICE — PADDING CAST 4 IN  COTTON STRL

## (undated) DEVICE — GAUZE SPONGES,16 PLY: Brand: CURITY

## (undated) DEVICE — MEDI-VAC YANK SUCT HNDL W/TPRD BULBOUS TIP: Brand: CARDINAL HEALTH

## (undated) DEVICE — CUFF TOURNIQUET 34 X 4 IN QUICK CONNECT DISP 1BLA

## (undated) DEVICE — INTENDED FOR TISSUE SEPARATION, AND OTHER PROCEDURES THAT REQUIRE A SHARP SURGICAL BLADE TO PUNCTURE OR CUT.: Brand: BARD-PARKER SAFETY BLADES SIZE 15, STERILE

## (undated) DEVICE — 3.5MM CORTEX SCREW SELF-TAPPING 40MM
Type: IMPLANTABLE DEVICE | Site: LEG | Status: NON-FUNCTIONAL
Removed: 2022-04-18

## (undated) DEVICE — 1.25MM NON-THREADED GUIDE WIRE 150MM

## (undated) DEVICE — DRAPE C-ARMOUR

## (undated) DEVICE — DRESSING MEPILEX AG BORDER 4 X 8 IN

## (undated) DEVICE — PAD CAST 6 IN COTTON NON STERILE

## (undated) DEVICE — DRAPE C-ARM X-RAY

## (undated) DEVICE — INTENDED FOR TISSUE SEPARATION, AND OTHER PROCEDURES THAT REQUIRE A SHARP SURGICAL BLADE TO PUNCTURE OR CUT.: Brand: BARD-PARKER SAFETY BLADES SIZE 10, STERILE

## (undated) DEVICE — 1.6MM KIRSCHNER WIRE W/TROCAR POINT 150MM
Type: IMPLANTABLE DEVICE | Site: LEG | Status: NON-FUNCTIONAL
Removed: 2022-04-18

## (undated) DEVICE — CURITY NON-ADHERENT STRIPS: Brand: CURITY

## (undated) DEVICE — ABDOMINAL PAD: Brand: DERMACEA

## (undated) DEVICE — PROXIMATE PLUS MD MULTI-DIRECTIONAL RELEASE SKIN STAPLERS CONTAINS 35 STAINLESS STEEL STAPLES APPROXIMATE CLOSED DIMENSIONS: 6.9MM X 3.9MM WIDE: Brand: PROXIMATE

## (undated) DEVICE — PLUMEPEN PRO 10FT

## (undated) DEVICE — SPONGE SCRUB 4 PCT CHLORHEXIDINE

## (undated) DEVICE — 2.5MM DRILL BIT/QC/GOLD/110MM

## (undated) DEVICE — BANDAGE, ESMARK LF STR 6"X9' (20/CS): Brand: CYPRESS

## (undated) DEVICE — KERLIX BANDAGE ROLL: Brand: KERLIX

## (undated) DEVICE — BETHLEHEM UNIV MAJ EXT ,KIT: Brand: CARDINAL HEALTH

## (undated) DEVICE — 3M™ IOBAN™ 2 ANTIMICROBIAL INCISE DRAPE 6650EZ: Brand: IOBAN™ 2

## (undated) DEVICE — IMPERVIOUS STOCKINETTE: Brand: DEROYAL

## (undated) DEVICE — CHLORAPREP HI-LITE 26ML ORANGE

## (undated) DEVICE — ACE WRAP 6 IN UNSTERILE

## (undated) DEVICE — 3.5MM CORTEX SCREW SELF-TAPPING 45MM
Type: IMPLANTABLE DEVICE | Site: LEG | Status: NON-FUNCTIONAL
Removed: 2022-04-18

## (undated) DEVICE — PAD GROUNDING ADULT

## (undated) DEVICE — 2.5MM DRILL BIT/QC/GOLD/180MM

## (undated) DEVICE — SUT VICRYL PLUS 2-0 CTB-1 27 IN VCPB259H

## (undated) DEVICE — SUT ETHILON 3-0 FSLX 30 IN 1673H

## (undated) DEVICE — BULB SYRINGE,IRRIGATION WITH PROTECTIVE CAP: Brand: DOVER

## (undated) DEVICE — 2.8MM PERCUTANEOUS DRILL BIT F/LCP® PL QC/200MM/100MM CALIB: Brand: LCP